# Patient Record
Sex: MALE | Race: WHITE | NOT HISPANIC OR LATINO | Employment: FULL TIME | URBAN - METROPOLITAN AREA
[De-identification: names, ages, dates, MRNs, and addresses within clinical notes are randomized per-mention and may not be internally consistent; named-entity substitution may affect disease eponyms.]

---

## 2017-02-17 ENCOUNTER — TELEPHONE (OUTPATIENT)
Dept: INTERNAL MEDICINE | Facility: CLINIC | Age: 57
End: 2017-02-17

## 2017-02-17 NOTE — TELEPHONE ENCOUNTER
"Pt called stating that insurance will not cover Spiriva and wants you to call in a generic RX. He said they suggested something that started with a \"T\". Please call him at [phone].  =====================================  Pt. Call back and said that the name that his insurance gave him was Tetrabenzine. I tried to look that up but it is not in the systems.  "

## 2017-02-21 ENCOUNTER — TELEPHONE (OUTPATIENT)
Dept: INTERNAL MEDICINE | Facility: CLINIC | Age: 57
End: 2017-02-21

## 2017-10-10 DIAGNOSIS — J44.9 CHRONIC OBSTRUCTIVE PULMONARY DISEASE, UNSPECIFIED COPD TYPE (HCC): Primary | ICD-10-CM

## 2017-10-10 NOTE — TELEPHONE ENCOUNTER
Pt called in and said he needed his Incruse inhaler medication rf.  That medicine is not on his list.   He has not been seen since July 2016.  He said he wont be back in town until dec.     He would like for you to send it to his [facility] and they will forward it down to [place].    [phone]

## 2017-10-10 NOTE — TELEPHONE ENCOUNTER
I called and left ms for pt that I would fax the rx and he could have it sent to where ever. I also let him know that he needed to be seen when he gets back and that  did not write that script and he said he would not rf it again until he is seen.

## 2017-10-10 NOTE — TELEPHONE ENCOUNTER
I printed off the prescription and someone will need to call it in whatever pharmacy once it sent to with 2 refills only

## 2017-12-18 ENCOUNTER — OFFICE VISIT (OUTPATIENT)
Dept: INTERNAL MEDICINE | Facility: CLINIC | Age: 57
End: 2017-12-18

## 2017-12-18 VITALS
SYSTOLIC BLOOD PRESSURE: 118 MMHG | TEMPERATURE: 98.3 F | BODY MASS INDEX: 23 KG/M2 | WEIGHT: 169.8 LBS | HEART RATE: 83 BPM | OXYGEN SATURATION: 96 % | DIASTOLIC BLOOD PRESSURE: 82 MMHG | HEIGHT: 72 IN

## 2017-12-18 DIAGNOSIS — J32.0 MAXILLARY SINUSITIS, UNSPECIFIED CHRONICITY: Primary | ICD-10-CM

## 2017-12-18 DIAGNOSIS — F17.219 CIGARETTE NICOTINE DEPENDENCE WITH NICOTINE-INDUCED DISORDER: ICD-10-CM

## 2017-12-18 DIAGNOSIS — Z11.59 NEED FOR HEPATITIS C SCREENING TEST: Primary | ICD-10-CM

## 2017-12-18 PROCEDURE — 99407 BEHAV CHNG SMOKING > 10 MIN: CPT | Performed by: FAMILY MEDICINE

## 2017-12-18 PROCEDURE — 99214 OFFICE O/P EST MOD 30 MIN: CPT | Performed by: FAMILY MEDICINE

## 2017-12-18 RX ORDER — CEFIXIME 400 MG/1
400 CAPSULE ORAL DAILY
Qty: 10 CAPSULE | Refills: 0 | Status: SHIPPED | OUTPATIENT
Start: 2017-12-18 | End: 2017-12-28

## 2017-12-18 NOTE — PROGRESS NOTES
Subjective   Hammad Justice is a 56 y.o. male.     Chief Complaint   Patient presents with   • Cough     productive/greenish started Friday         Cough   This is a new problem. The current episode started yesterday. The problem occurs constantly. The cough is non-productive. Pertinent negatives include no chest pain, chills, ear congestion, fever, rash, rhinorrhea, sore throat or shortness of breath. Nothing aggravates the symptoms. He has tried nothing for the symptoms.   Sinusitis   This is a new problem. The current episode started in the past 7 days. The problem has been gradually worsening since onset. There has been no fever. The pain is moderate. Associated symptoms include congestion, coughing and sinus pressure. Pertinent negatives include no chills, shortness of breath, sneezing or sore throat. Past treatments include nothing. The treatment provided no relief.        Patient does desire to quit smoking.  Patient states that he's been smoking for many many years.  He has tried multiple things in the past and has been unsuccessful.    The following portions of the patient's history were reviewed and updated as appropriate: allergies, current medications, past family history, past medical history, past social history, past surgical history and problem list.    Review of Systems   Constitutional: Negative for chills and fever.   HENT: Positive for congestion, sinus pain and sinus pressure. Negative for rhinorrhea, sneezing and sore throat.    Eyes: Negative for photophobia and visual disturbance.   Respiratory: Positive for cough. Negative for chest tightness and shortness of breath.    Cardiovascular: Negative for chest pain and palpitations.   Gastrointestinal: Negative for diarrhea, nausea and vomiting.   Genitourinary: Negative for dysuria, frequency and urgency.   Skin: Negative for rash and wound.   Neurological: Negative for dizziness and syncope.   Psychiatric/Behavioral: Negative for behavioral  "problems and confusion.       Objective   Physical Exam   Constitutional: He is oriented to person, place, and time. He appears well-developed and well-nourished.   HENT:   Head: Normocephalic and atraumatic.   Right Ear: External ear normal.   Left Ear: External ear normal.   Nose: Right sinus exhibits maxillary sinus tenderness. Left sinus exhibits maxillary sinus tenderness.   Mouth/Throat: Oropharynx is clear and moist.   Eyes: EOM are normal.   Neck: Normal range of motion. Neck supple.   Cardiovascular: Normal rate, regular rhythm and normal heart sounds.    Pulmonary/Chest: Effort normal and breath sounds normal. No respiratory distress.   Musculoskeletal: Normal range of motion.   Lymphadenopathy:     He has no cervical adenopathy.   Neurological: He is alert and oriented to person, place, and time.   Skin: Skin is warm.   Psychiatric: He has a normal mood and affect. His behavior is normal.   Nursing note and vitals reviewed.      Assessment/Plan   Hammad was seen today for cough.    Diagnoses and all orders for this visit:    Nicotine dependence        -     \"I advised the patient of the risks in continuing to use tobacco,\" and \"I provided this patient with smoking cessation educational materials and discussed how to quit smoking,\" and \"patient has expressed the willingness to quit\".        -     Patient has declined taking Chantix, Zyban, nicotine gum and patches.  He states that if he quit smoking he will be cold turkey.  He is not set aside a date for this.        -     I spent greater than 10 minutes counseling patient about smoking cessation.  I discussed with him the risk associated with smoking.  Patient has expressed the desire to quit smoking with me.    Maxillary sinusitis, unspecified chronicity  -     Cefixime (SUPRAX) 400 MG tablet; Take 1 tablet by mouth Daily for 10 days.  -     Signs of symptoms are most likely due to sinus infection. Recommend OTC medications. Recommend the use of netti " pot. Patient to start suprax.           Return for Next scheduled follow up.    Much of this encounter note is an electronic transcription/translation of spoken language to printed text. The electronic translation of spoken language may permit erroneous, or at times, nonsensical words or phrases to be inadvertently transcribed; although I have reviewed the note for such errors, some may still exist.

## 2017-12-20 LAB
25(OH)D3+25(OH)D2 SERPL-MCNC: 33.5 NG/ML (ref 30–100)
ALBUMIN SERPL-MCNC: 4.6 G/DL (ref 3.5–5.2)
ALBUMIN/GLOB SERPL: 1.7 G/DL
ALP SERPL-CCNC: 108 U/L (ref 39–117)
ALT SERPL-CCNC: 28 U/L (ref 1–41)
APPEARANCE UR: CLEAR
AST SERPL-CCNC: 26 U/L (ref 1–40)
BASOPHILS # BLD AUTO: 0.03 10*3/MM3 (ref 0–0.2)
BASOPHILS NFR BLD AUTO: 0.4 % (ref 0–1.5)
BILIRUB SERPL-MCNC: 0.3 MG/DL (ref 0.1–1.2)
BILIRUB UR QL STRIP: NEGATIVE
BUN SERPL-MCNC: 13 MG/DL (ref 6–20)
BUN/CREAT SERPL: 15.5 (ref 7–25)
CALCIUM SERPL-MCNC: 9.4 MG/DL (ref 8.6–10.5)
CHLORIDE SERPL-SCNC: 99 MMOL/L (ref 98–107)
CHOLEST SERPL-MCNC: 211 MG/DL (ref 100–199)
CO2 SERPL-SCNC: 27.1 MMOL/L (ref 22–29)
COLOR UR: YELLOW
CREAT SERPL-MCNC: 0.84 MG/DL (ref 0.76–1.27)
EOSINOPHIL # BLD AUTO: 0.23 10*3/MM3 (ref 0–0.7)
EOSINOPHIL NFR BLD AUTO: 2.8 % (ref 0.3–6.2)
ERYTHROCYTE [DISTWIDTH] IN BLOOD BY AUTOMATED COUNT: 12.6 % (ref 11.5–14.5)
GLOBULIN SER CALC-MCNC: 2.7 GM/DL
GLUCOSE SERPL-MCNC: 84 MG/DL (ref 65–99)
GLUCOSE UR QL: NEGATIVE
HCT VFR BLD AUTO: 56.1 % (ref 40.4–52.2)
HCV AB S/CO SERPL IA: <0.1 S/CO RATIO (ref 0–0.9)
HDL SERPL-SCNC: 48.7 UMOL/L
HDLC SERPL-MCNC: 71 MG/DL
HGB BLD-MCNC: 18.3 G/DL (ref 13.7–17.6)
HGB UR QL STRIP: NEGATIVE
IMM GRANULOCYTES # BLD: 0.03 10*3/MM3 (ref 0–0.03)
IMM GRANULOCYTES NFR BLD: 0.4 % (ref 0–0.5)
KETONES UR QL STRIP: NEGATIVE
LDL SERPL QN: 21.1 NM
LDL SERPL-SCNC: 1531 NMOL/L
LDL SMALL SERPL-SCNC: 772 NMOL/L
LDLC SERPL CALC-MCNC: 107 MG/DL (ref 0–99)
LEUKOCYTE ESTERASE UR QL STRIP: NEGATIVE
LYMPHOCYTES # BLD AUTO: 1.29 10*3/MM3 (ref 0.9–4.8)
LYMPHOCYTES NFR BLD AUTO: 15.7 % (ref 19.6–45.3)
MCH RBC QN AUTO: 33.8 PG (ref 27–32.7)
MCHC RBC AUTO-ENTMCNC: 32.6 G/DL (ref 32.6–36.4)
MCV RBC AUTO: 103.5 FL (ref 79.8–96.2)
MONOCYTES # BLD AUTO: 0.86 10*3/MM3 (ref 0.2–1.2)
MONOCYTES NFR BLD AUTO: 10.5 % (ref 5–12)
NEUTROPHILS # BLD AUTO: 5.77 10*3/MM3 (ref 1.9–8.1)
NEUTROPHILS NFR BLD AUTO: 70.2 % (ref 42.7–76)
NITRITE UR QL STRIP: NEGATIVE
PH UR STRIP: 5.5 [PH] (ref 5–8)
PLATELET # BLD AUTO: 197 10*3/MM3 (ref 140–500)
POTASSIUM SERPL-SCNC: 5 MMOL/L (ref 3.5–5.2)
PROT SERPL-MCNC: 7.3 G/DL (ref 6–8.5)
PROT UR QL STRIP: NEGATIVE
PSA SERPL-MCNC: 1.14 NG/ML (ref 0–4)
RBC # BLD AUTO: 5.42 10*6/MM3 (ref 4.6–6)
SODIUM SERPL-SCNC: 140 MMOL/L (ref 136–145)
SP GR UR: 1.02 (ref 1–1.03)
T3FREE SERPL-MCNC: 3.5 PG/ML (ref 2–4.4)
T4 FREE SERPL-MCNC: 1.13 NG/DL (ref 0.93–1.7)
TRIGL SERPL-MCNC: 164 MG/DL (ref 0–149)
TSH SERPL DL<=0.005 MIU/L-ACNC: 0.75 MIU/ML (ref 0.27–4.2)
UROBILINOGEN UR STRIP-MCNC: NORMAL MG/DL
WBC # BLD AUTO: 8.21 10*3/MM3 (ref 4.5–10.7)

## 2018-01-10 ENCOUNTER — OFFICE VISIT (OUTPATIENT)
Dept: INTERNAL MEDICINE | Facility: CLINIC | Age: 58
End: 2018-01-10

## 2018-01-10 VITALS
DIASTOLIC BLOOD PRESSURE: 72 MMHG | HEIGHT: 71 IN | SYSTOLIC BLOOD PRESSURE: 124 MMHG | WEIGHT: 169 LBS | HEART RATE: 84 BPM | BODY MASS INDEX: 23.66 KG/M2 | OXYGEN SATURATION: 98 %

## 2018-01-10 DIAGNOSIS — J44.9 CHRONIC OBSTRUCTIVE PULMONARY DISEASE, UNSPECIFIED COPD TYPE (HCC): Chronic | ICD-10-CM

## 2018-01-10 DIAGNOSIS — F17.219 CIGARETTE NICOTINE DEPENDENCE WITH NICOTINE-INDUCED DISORDER: Chronic | ICD-10-CM

## 2018-01-10 DIAGNOSIS — E78.5 HYPERLIPIDEMIA, UNSPECIFIED HYPERLIPIDEMIA TYPE: Chronic | ICD-10-CM

## 2018-01-10 DIAGNOSIS — D75.1 POLYCYTHEMIA SECONDARY TO SMOKING: Chronic | ICD-10-CM

## 2018-01-10 DIAGNOSIS — Z51.81 THERAPEUTIC DRUG MONITORING: ICD-10-CM

## 2018-01-10 DIAGNOSIS — Z23 NEED FOR PNEUMOCOCCAL VACCINATION: ICD-10-CM

## 2018-01-10 DIAGNOSIS — Z86.010 HISTORY OF COLON POLYPS: Chronic | ICD-10-CM

## 2018-01-10 DIAGNOSIS — E55.9 VITAMIN D DEFICIENCY: Chronic | ICD-10-CM

## 2018-01-10 DIAGNOSIS — Z00.00 ROUTINE PHYSICAL EXAMINATION: Primary | ICD-10-CM

## 2018-01-10 DIAGNOSIS — Z71.6 ENCOUNTER FOR SMOKING CESSATION COUNSELING: ICD-10-CM

## 2018-01-10 DIAGNOSIS — Z23 NEED FOR TDAP VACCINATION: ICD-10-CM

## 2018-01-10 PROCEDURE — 99396 PREV VISIT EST AGE 40-64: CPT | Performed by: INTERNAL MEDICINE

## 2018-01-10 PROCEDURE — 99406 BEHAV CHNG SMOKING 3-10 MIN: CPT | Performed by: INTERNAL MEDICINE

## 2018-01-10 PROCEDURE — 90472 IMMUNIZATION ADMIN EACH ADD: CPT | Performed by: INTERNAL MEDICINE

## 2018-01-10 PROCEDURE — 90732 PPSV23 VACC 2 YRS+ SUBQ/IM: CPT | Performed by: INTERNAL MEDICINE

## 2018-01-10 PROCEDURE — 90715 TDAP VACCINE 7 YRS/> IM: CPT | Performed by: INTERNAL MEDICINE

## 2018-01-10 PROCEDURE — 90471 IMMUNIZATION ADMIN: CPT | Performed by: INTERNAL MEDICINE

## 2018-01-10 NOTE — PROGRESS NOTES
01/10/2018    Patient Information  Hammad Justice                                                                                          [address]  [facility] [address]      1960  [phone]      Chief Complaint:     Routine annual physical examination and follow-up lab work.  No new acute complaints.    History of Present Illness:    Patient with a history of hyperlipidemia, COPD, cigarette smoking, secondary polycythemia, history of hyperplastic colon polyps, vitamin D deficiency.  He presents today for his routine annual exam and follow-up lab work.  He currently indicates that he is feeling well and has no new acute complaints.  Past medical history reviewed and updated where necessary including health maintenance parameters.  This reveals he needs a TDaP in PPSV 23 which we can give today.  He prefers not to get the influenza vaccination.    Review of Systems   Constitution: Negative.   HENT: Negative.    Eyes: Negative.    Cardiovascular: Negative.    Respiratory: Positive for cough, snoring and wheezing.    Endocrine: Negative.    Hematologic/Lymphatic: Negative.    Skin: Negative.    Musculoskeletal: Negative.    Gastrointestinal: Negative.    Genitourinary: Negative.    Neurological: Negative.    Psychiatric/Behavioral: Negative.    Allergic/Immunologic: Negative.        Active Problems:    Patient Active Problem List   Diagnosis   • COPD (chronic obstructive pulmonary disease)   • History of colon polyps, 09/12/2013--hyperplastic ×1.   • Generalized osteoarthritis of multiple sites   • Hyperlipidemia   • Nicotine dependence   • Vitamin D deficiency   • Therapeutic drug monitoring   • Routine physical examination   • Encounter for smoking cessation counseling   • Polycythemia secondary to smoking         Past Medical History:   Diagnosis Date   • COPD (chronic obstructive pulmonary disease) 8/12/2013 08/12/2013--pulmonary function tests revealed FVC 3.35, 66% of predicted. Post  bronchodilator FVC 3.31. FEV1 1.77, 45% of predicted. 1.8 to post bronchodilator. FEV1/FVC 53, Total lung capacity is 105% of predicted. Normal diffusion. Moderately severe obstructive pattern with no reversibility.   08/12/2013--pulmonary consult. Pulmonary function tests revealed moderate to severe obstructive pattern. Diagnosis COPD. Chronic bronchitis type. Patient was started on Ventolin when necessary and Spiriva HandiHaler use once daily.   • Generalized osteoarthritis of multiple sites 6/14/2016   • History of acute bronchitis with bronchospasm 09/26/2012 09/26/2012--exacerbation asthmatic bronchitis treated with prednisone and Levaquin.   • History of Benign essential hypertension 04/04/2012 04/04/2012--blood pressure remains low and patient had to discontinue lisinopril because of dizziness.   03/14/2012--blood pressure too low at 102/64. Lisinopril HCT decreased to 10/12.5.   02/22/2012--blood pressure elevated at 140/90. Lisinopril HCT 20/12.5 daily initiated.   • History of chest x-ray 05/03/2012 05/03/2012--chest x-ray negative.   • History of colon polyps, 09/12/2013--hyperplastic ×1. 9/12/2013 09/12/2013--colonoscopy revealed a 3-4 mm polyp in the sigmoid colon which was biopsied and removed. Otherwise, normal colonoscopy to cecum with an excellent prep. Pathology returned hyperplastic.   • History of pulmonary function tests 08/12/2013 08/12/2013--pulmonary function tests revealed FVC 3.35, 66% of predicted. Post bronchodilator FVC 3.31. FEV1 1.77, 45% of predicted. 1.8 to post bronchodilator. FEV1/FVC 53, Total lung capacity is 105% of predicted. Normal diffusion. Moderately severe obstructive pattern with no reversibility.   • Hyperlipidemia 6/14/2016   • Nicotine dependence 6/14/2016   • Polycythemia secondary to smoking 7/7/2016 07/07/2016--routine physical exam.  CBC reveals hemoglobin elevated at 17.9 and hematocrit elevated at 53.5.  Patient has a somewhat heavy smoker and I  "feel this is the etiology of this and will not pursue hematologic workup, at least at the present time.  Patient is in the process of trying to discontinue cigarettes.  Aspirin 81 mg per day initiated.   • Vitamin D deficiency 2016         Past Surgical History:   Procedure Laterality Date   • COLONOSCOPY  2013--colonoscopy revealed a 3-4 mm polyp in the sigmoid colon which was biopsied and removed. Otherwise, normal colonoscopy to cecum with an excellent prep. Pathology returned hyperplastic.   • KNEE ARTHROSCOPY Right 33 years of age    Age 33--right meniscus surgery.         No Known Allergies        Current Outpatient Prescriptions:   •  aspirin 81 MG tablet, Take 1 tablet by mouth daily., Disp: 90 tablet, Rfl: 3  •  INCRUSE ELLIPTA 62.5 MCG/INH aerosol powder , INHALE 1 PUFF DAILY., Disp: 30 each, Rfl: 2    Current Facility-Administered Medications:   •  pneumococcal polysaccharide 23-valent (PNEUMOVAX-23) vaccine 0.5 mL, 0.5 mL, Intramuscular, During Hospitalization, Niko Villasenor MD      Family History   Problem Relation Age of Onset   • Anuerysm Brother      Cerebral Artery Aneurysm. Brother  of a brain aneurysm at age 52.   • Hypertension Other      Benign Essential   • Heart disease Other          Social History     Social History   • Marital status: Single     Spouse name: N/A   • Number of children: N/A   • Years of education: N/A     Occupational History   •  Consultant      Social History Main Topics   • Smoking status: Current Every Day Smoker     Packs/day: 2.00   • Smokeless tobacco: Never Used   • Alcohol use Yes      Comment: Moderately   • Drug use: No   • Sexual activity: Yes     Partners: Female     Other Topics Concern   • Not on file     Social History Narrative         Vitals:    01/10/18 1229   BP: 124/72   Pulse: 84   SpO2: 98%   Weight: 76.7 kg (169 lb)   Height: 181.6 cm (71.5\")          Physical Exam:      General: Alert and oriented x " 3, with appropriate affect; no acute distress.  HEENT: pupils equal, round, and reactive to light; extraocular movements intact; sclera nonicteric; nasal mucosa normal; pharynx normal; tympanic membranes and ear canals normal.  Neck: without JVD, thyromegaly, bruit, or adenopathy.  Lungs: Mild bilateral scattered rhonchi and occasional wheeze.  Heart: auscultation reveals regular rate and rhythm without murmur, rub, gallop, or click.  Abdomen: is soft and nontender, without hepato-splenomegaly, mass or hernia. Normal bowel sounds; .  Urologic exam: reveals normal male genitalia without testicular mass or penile/scrotal lesion.  Digital rectal exam and Prostate: deferred.  Extremities: are without clubbing, cyanosis, or edema.  Vascular: no signs of peripheral arterial disease or venous insufficiency/varicosities.  Neurological: intact without focal deficit, including cranial and peripheral nerves.  Station and gait observed to be normal during ingress and egress from the examination area.  Sensation and deep tendon reflexes tested if clinically indicated and are normal.  Musculoskeletal: exam is normal, without signs of synovitis, significant degeneration or deformity. Skin examination: without rash or significant lesions.      Lab/other results:    NMR reveals total cholesterol of 211.  Triglycerides elevated at 164.  LDL particle number borderline elevated 1531.  Small LDL particle number elevated at 772.  HDL particle number is very good at 48.7.  Urinalysis normal.  Thyroid function tests normal.  PSA normal at 1.14.  Vitamin D normal.  Hepatitis C antibody screening is negative.    Assessment/Plan:     Diagnosis Plan   1. Routine physical examination     2. Hyperlipidemia, unspecified hyperlipidemia type     3. Chronic obstructive pulmonary disease, unspecified COPD type     4. Cigarette nicotine dependence with nicotine-induced disorder     5. Encounter for smoking cessation counseling     6. Polycythemia  secondary to smoking     7. History of colon polyps, 09/12/2013--hyperplastic ×1.     8. Vitamin D deficiency     9. Therapeutic drug monitoring     10. Need for pneumococcal vaccination  pneumococcal polysaccharide 23-valent (PNEUMOVAX-23) vaccine 0.5 mL   11. Need for Tdap vaccination  Tdap Vaccine Greater Than or Equal To 8yo IM     Patient presents with essentially normal annual physical except for the following issues: He has mild hyperlipidemia that has much improved with diet over the years.  COPD seems to be stable on the current inhaler.  Patient is well aware regarding the need to stop smoking.  See below.  Secondary polycythemia is related to the cigarette smoking and COPD and this would certainly benefit from discontinuation of cigarettes.  I do not think the polycythemia is high enough to cause hyperviscosity at this time but I have recommended that patient take an aspirin 81 mg on a regular basis.  Patient has a history of hyperplastic colon polyps that do not increase his risk for colon cancer.  Vitamin D is therapeutic.    Plan is as follows: PPSV 23 given.  TDaP given.  Encourage smoking cessation.  Patient will follow-up in one year with lab prior for his annual exam or follow-up as needed.    01/10/2018--encounter for smoking cessation.  I advised the patient of the risks and continuing to use tobacco, specifically cigarettes smoking, and I have provided patient with smoking cessation educational materials and discussed how to quit smoking including the possibility of using medication.  Patient is well aware that he needs to quit but he has found it quite difficult to do so.  He is currently not quite ready to make the commitment that is giving it quite a consideration.      Procedures

## 2018-01-27 ENCOUNTER — HOSPITAL ENCOUNTER (OUTPATIENT)
Dept: CARDIOLOGY | Facility: HOSPITAL | Age: 58
Discharge: HOME | End: 2018-01-27
Attending: SPECIALIST | Admitting: SPECIALIST

## 2018-01-27 ENCOUNTER — TRANSCRIBE ORDERS (OUTPATIENT)
Dept: ADMINISTRATIVE | Facility: HOSPITAL | Age: 58
End: 2018-01-27

## 2018-01-27 ENCOUNTER — LAB (OUTPATIENT)
Dept: LAB | Facility: HOSPITAL | Age: 58
End: 2018-01-27
Attending: SPECIALIST

## 2018-01-27 DIAGNOSIS — I10 ESSENTIAL HYPERTENSION, MALIGNANT: ICD-10-CM

## 2018-01-27 DIAGNOSIS — I10 ESSENTIAL HYPERTENSION, MALIGNANT: Primary | ICD-10-CM

## 2018-01-27 LAB
ANION GAP SERPL CALCULATED.3IONS-SCNC: 10.1 MMOL/L
BASOPHILS # BLD AUTO: 0.04 10*3/MM3 (ref 0–0.2)
BASOPHILS NFR BLD AUTO: 0.7 % (ref 0–2)
BUN BLD-MCNC: 13 MG/DL (ref 6–20)
BUN/CREAT SERPL: 11.8 (ref 7–25)
CALCIUM SPEC-SCNC: 8.8 MG/DL (ref 8.6–10.5)
CHLORIDE SERPL-SCNC: 99 MMOL/L (ref 98–107)
CO2 SERPL-SCNC: 28.9 MMOL/L (ref 22–29)
CREAT BLD-MCNC: 1.1 MG/DL (ref 0.76–1.27)
DEPRECATED RDW RBC AUTO: 44.3 FL (ref 37–54)
EOSINOPHIL # BLD AUTO: 0.24 10*3/MM3 (ref 0.1–0.3)
EOSINOPHIL NFR BLD AUTO: 4.1 % (ref 0–4)
ERYTHROCYTE [DISTWIDTH] IN BLOOD BY AUTOMATED COUNT: 12.2 % (ref 11.5–14.5)
GFR SERPL CREATININE-BSD FRML MDRD: 69 ML/MIN/1.73
GLUCOSE BLD-MCNC: 89 MG/DL (ref 65–99)
HCT VFR BLD AUTO: 51.6 % (ref 42–52)
HGB BLD-MCNC: 17.5 G/DL (ref 14–18)
IMM GRANULOCYTES # BLD: 0.02 10*3/MM3 (ref 0–0.03)
IMM GRANULOCYTES NFR BLD: 0.3 % (ref 0–0.5)
LYMPHOCYTES # BLD AUTO: 1.43 10*3/MM3 (ref 0.6–4.8)
LYMPHOCYTES NFR BLD AUTO: 24.5 % (ref 20–45)
MCH RBC QN AUTO: 33.1 PG (ref 27–31)
MCHC RBC AUTO-ENTMCNC: 33.9 G/DL (ref 31–37)
MCV RBC AUTO: 97.7 FL (ref 80–94)
MONOCYTES # BLD AUTO: 0.7 10*3/MM3 (ref 0–1)
MONOCYTES NFR BLD AUTO: 12 % (ref 3–8)
NEUTROPHILS # BLD AUTO: 3.41 10*3/MM3 (ref 1.5–8.3)
NEUTROPHILS NFR BLD AUTO: 58.4 % (ref 45–70)
NRBC BLD MANUAL-RTO: 0 /100 WBC (ref 0–0)
PLATELET # BLD AUTO: 236 10*3/MM3 (ref 140–500)
PMV BLD AUTO: 10.7 FL (ref 7.4–10.4)
POTASSIUM BLD-SCNC: 3.7 MMOL/L (ref 3.5–5.2)
RBC # BLD AUTO: 5.28 10*6/MM3 (ref 4.7–6.1)
SODIUM BLD-SCNC: 138 MMOL/L (ref 136–145)
WBC NRBC COR # BLD: 5.84 10*3/MM3 (ref 4.8–10.8)

## 2018-01-27 PROCEDURE — 93005 ELECTROCARDIOGRAM TRACING: CPT | Performed by: SPECIALIST

## 2018-01-27 PROCEDURE — 80048 BASIC METABOLIC PNL TOTAL CA: CPT

## 2018-01-27 PROCEDURE — 36415 COLL VENOUS BLD VENIPUNCTURE: CPT

## 2018-01-27 PROCEDURE — 85025 COMPLETE CBC W/AUTO DIFF WBC: CPT

## 2018-01-27 PROCEDURE — 93010 ELECTROCARDIOGRAM REPORT: CPT | Performed by: INTERNAL MEDICINE

## 2019-03-13 DIAGNOSIS — E55.9 VITAMIN D DEFICIENCY: Chronic | ICD-10-CM

## 2019-03-13 DIAGNOSIS — E78.5 HYPERLIPIDEMIA, UNSPECIFIED HYPERLIPIDEMIA TYPE: Chronic | ICD-10-CM

## 2019-03-13 DIAGNOSIS — Z00.00 ROUTINE PHYSICAL EXAMINATION: Primary | ICD-10-CM

## 2019-03-14 LAB
25(OH)D3+25(OH)D2 SERPL-MCNC: 25.5 NG/ML (ref 30–100)
ALBUMIN SERPL-MCNC: 4.5 G/DL (ref 3.5–5.2)
ALBUMIN/GLOB SERPL: 2 G/DL
ALP SERPL-CCNC: 82 U/L (ref 39–117)
ALT SERPL-CCNC: 25 U/L (ref 1–41)
APPEARANCE UR: CLEAR
AST SERPL-CCNC: 20 U/L (ref 1–40)
BACTERIA #/AREA URNS HPF: ABNORMAL /HPF
BASOPHILS # BLD AUTO: 0.03 10*3/MM3 (ref 0–0.2)
BASOPHILS NFR BLD AUTO: 0.6 % (ref 0–1.5)
BILIRUB SERPL-MCNC: 0.4 MG/DL (ref 0.1–1.2)
BILIRUB UR QL STRIP: NEGATIVE
BUN SERPL-MCNC: 17 MG/DL (ref 6–20)
BUN/CREAT SERPL: 17.7 (ref 7–25)
CALCIUM SERPL-MCNC: 9.8 MG/DL (ref 8.6–10.5)
CASTS URNS MICRO: ABNORMAL
CHLORIDE SERPL-SCNC: 103 MMOL/L (ref 98–107)
CHOLEST SERPL-MCNC: 197 MG/DL (ref 100–199)
CK SERPL-CCNC: 117 U/L (ref 20–200)
CO2 SERPL-SCNC: 28.8 MMOL/L (ref 22–29)
COLOR UR: YELLOW
CREAT SERPL-MCNC: 0.96 MG/DL (ref 0.76–1.27)
CRYSTALS URNS MICRO: ABNORMAL
EOSINOPHIL # BLD AUTO: 0.16 10*3/MM3 (ref 0–0.4)
EOSINOPHIL NFR BLD AUTO: 3.3 % (ref 0.3–6.2)
EPI CELLS #/AREA URNS HPF: ABNORMAL /HPF
ERYTHROCYTE [DISTWIDTH] IN BLOOD BY AUTOMATED COUNT: 13.2 % (ref 12.3–15.4)
GLOBULIN SER CALC-MCNC: 2.3 GM/DL
GLUCOSE SERPL-MCNC: 106 MG/DL (ref 65–99)
GLUCOSE UR QL: NEGATIVE
HCT VFR BLD AUTO: 53.4 % (ref 37.5–51)
HDL SERPL-SCNC: 40.9 UMOL/L
HDLC SERPL-MCNC: 54 MG/DL
HGB BLD-MCNC: 17 G/DL (ref 13–17.7)
HGB UR QL STRIP: NEGATIVE
IMM GRANULOCYTES # BLD AUTO: 0.02 10*3/MM3 (ref 0–0.05)
IMM GRANULOCYTES NFR BLD AUTO: 0.4 % (ref 0–0.5)
KETONES UR QL STRIP: NEGATIVE
LDL SERPL QN: 21.7 NM
LDL SERPL-SCNC: 1230 NMOL/L
LDL SMALL SERPL-SCNC: 435 NMOL/L
LDLC SERPL CALC-MCNC: 108 MG/DL (ref 0–99)
LEUKOCYTE ESTERASE UR QL STRIP: ABNORMAL
LYMPHOCYTES # BLD AUTO: 1.01 10*3/MM3 (ref 0.7–3.1)
LYMPHOCYTES NFR BLD AUTO: 20.7 % (ref 19.6–45.3)
MCH RBC QN AUTO: 32.2 PG (ref 26.6–33)
MCHC RBC AUTO-ENTMCNC: 31.8 G/DL (ref 31.5–35.7)
MCV RBC AUTO: 101.1 FL (ref 79–97)
MONOCYTES # BLD AUTO: 0.6 10*3/MM3 (ref 0.1–0.9)
MONOCYTES NFR BLD AUTO: 12.3 % (ref 5–12)
NEUTROPHILS # BLD AUTO: 3.06 10*3/MM3 (ref 1.4–7)
NEUTROPHILS NFR BLD AUTO: 62.7 % (ref 42.7–76)
NITRITE UR QL STRIP: NEGATIVE
NRBC BLD AUTO-RTO: 0 /100 WBC (ref 0–0)
PH UR STRIP: 5.5 [PH] (ref 5–8)
PLATELET # BLD AUTO: 213 10*3/MM3 (ref 140–450)
POTASSIUM SERPL-SCNC: 4.7 MMOL/L (ref 3.5–5.2)
PROT SERPL-MCNC: 6.8 G/DL (ref 6–8.5)
PROT UR QL STRIP: NEGATIVE
PSA SERPL-MCNC: 1.02 NG/ML (ref 0–4)
RBC # BLD AUTO: 5.28 10*6/MM3 (ref 4.14–5.8)
RBC #/AREA URNS HPF: ABNORMAL /HPF
SODIUM SERPL-SCNC: 142 MMOL/L (ref 136–145)
SP GR UR: 1.03 (ref 1–1.03)
T3FREE SERPL-MCNC: 3.6 PG/ML (ref 2–4.4)
T4 FREE SERPL-MCNC: 1.08 NG/DL (ref 0.93–1.7)
TRIGL SERPL-MCNC: 173 MG/DL (ref 0–149)
TSH SERPL DL<=0.005 MIU/L-ACNC: 0.87 MIU/ML (ref 0.27–4.2)
UROBILINOGEN UR STRIP-MCNC: ABNORMAL MG/DL
WBC # BLD AUTO: 4.88 10*3/MM3 (ref 3.4–10.8)
WBC #/AREA URNS HPF: ABNORMAL /HPF

## 2019-03-20 ENCOUNTER — OFFICE VISIT (OUTPATIENT)
Dept: INTERNAL MEDICINE | Facility: CLINIC | Age: 59
End: 2019-03-20

## 2019-03-20 VITALS
SYSTOLIC BLOOD PRESSURE: 132 MMHG | WEIGHT: 175 LBS | HEIGHT: 71 IN | DIASTOLIC BLOOD PRESSURE: 80 MMHG | OXYGEN SATURATION: 98 % | HEART RATE: 84 BPM | BODY MASS INDEX: 24.5 KG/M2

## 2019-03-20 DIAGNOSIS — F17.219 CIGARETTE NICOTINE DEPENDENCE WITH NICOTINE-INDUCED DISORDER: Chronic | ICD-10-CM

## 2019-03-20 DIAGNOSIS — J44.9 CHRONIC OBSTRUCTIVE PULMONARY DISEASE, UNSPECIFIED COPD TYPE (HCC): Chronic | ICD-10-CM

## 2019-03-20 DIAGNOSIS — M15.9 GENERALIZED OSTEOARTHRITIS OF MULTIPLE SITES: Chronic | ICD-10-CM

## 2019-03-20 DIAGNOSIS — Z86.010 HISTORY OF COLON POLYPS: Chronic | ICD-10-CM

## 2019-03-20 DIAGNOSIS — Z00.00 ROUTINE PHYSICAL EXAMINATION: Primary | ICD-10-CM

## 2019-03-20 DIAGNOSIS — R82.998 CALCIUM OXALATE CRYSTALS PRESENT IN URINE: ICD-10-CM

## 2019-03-20 DIAGNOSIS — R39.12 BENIGN PROSTATIC HYPERPLASIA WITH WEAK URINARY STREAM: ICD-10-CM

## 2019-03-20 DIAGNOSIS — Z71.6 ENCOUNTER FOR SMOKING CESSATION COUNSELING: ICD-10-CM

## 2019-03-20 DIAGNOSIS — N40.1 BENIGN PROSTATIC HYPERPLASIA WITH WEAK URINARY STREAM: ICD-10-CM

## 2019-03-20 DIAGNOSIS — E78.5 HYPERLIPIDEMIA, UNSPECIFIED HYPERLIPIDEMIA TYPE: Chronic | ICD-10-CM

## 2019-03-20 DIAGNOSIS — R82.81 PYURIA: ICD-10-CM

## 2019-03-20 DIAGNOSIS — E55.9 VITAMIN D DEFICIENCY: Chronic | ICD-10-CM

## 2019-03-20 DIAGNOSIS — D75.1 POLYCYTHEMIA SECONDARY TO SMOKING: Chronic | ICD-10-CM

## 2019-03-20 PROBLEM — R73.01 IMPAIRED FASTING GLUCOSE: Status: ACTIVE | Noted: 2019-03-20

## 2019-03-20 PROBLEM — R73.01 IMPAIRED FASTING GLUCOSE: Chronic | Status: ACTIVE | Noted: 2019-03-20

## 2019-03-20 PROCEDURE — 99396 PREV VISIT EST AGE 40-64: CPT | Performed by: INTERNAL MEDICINE

## 2019-03-20 NOTE — PROGRESS NOTES
03/20/2019    Patient Information  Hammad Justice                                                                                          [address] [facility] [address]      1960  [unfilled]  There is no work phone number on file.    Chief Complaint:     Routine annual physical examination and follow-up lab work.  Complaining of decreased urine flow.    History of Present Illness:    Patient with a history of COPD, colon polyps, osteoarthritis, hyperlipidemia, polycythemia secondary to smoking.  He presents today for his routine annual physical exam and follow-up lab work.  Patient does have a new complaint consistent with BPH as described below.  His past medical history reviewed and updated were necessary including health maintenance parameters.  This reveals he is up-to-date or else accounted for.  Patient does not get influenza vaccine.  We did discuss the new shingles vaccine and patient will check with his insurance company regarding coverage here in the office.    The history regarding decreased urine flow:    March 20, 2019--patient presents with a progressively worsening decreased urine flow over the past year or so.  He reports he just takes a long time for him to initiate and complete urination.  He has no pain or dysuria.  No blood noted in his urine.  Urinalysis today reveals 31-50 WBCs but there are 3-6 epithelial cells present as well as 1+ calcium oxalate crystals.  No bacteria seen.  No red blood cells.  PSA is normal at 1.02.  Given the pyuria as well as calcium oxalate crystals, patient referred to the urologist.    Review of Systems   Constitution: Negative.   HENT: Negative.    Eyes: Negative.    Cardiovascular: Negative.    Respiratory: Negative.    Endocrine: Negative.    Hematologic/Lymphatic: Negative.    Skin: Negative.    Musculoskeletal: Negative.    Gastrointestinal: Negative.    Genitourinary: Positive for hesitancy, incomplete emptying and nocturia. Negative for  hematuria, pelvic pain and urgency.   Neurological: Negative.    Psychiatric/Behavioral: Negative.    Allergic/Immunologic: Negative.        Active Problems:    Patient Active Problem List   Diagnosis   • COPD (chronic obstructive pulmonary disease) (CMS/[facility])   • History of colon polyps, 09/12/2013--hyperplastic ×1.   • Generalized osteoarthritis of multiple sites   • Hyperlipidemia   • Nicotine dependence   • Vitamin D deficiency   • Therapeutic drug monitoring   • Routine physical examination   • Encounter for smoking cessation counseling   • Polycythemia secondary to smoking   • Benign prostatic hyperplasia with weak urinary stream   • Pyuria   • Calcium oxalate crystals present in urine         Past Medical History:   Diagnosis Date   • COPD (chronic obstructive pulmonary disease) (CMS/[facility]) 8/12/2013 08/12/2013--pulmonary function tests revealed FVC 3.35, 66% of predicted. Post bronchodilator FVC 3.31. FEV1 1.77, 45% of predicted. 1.8 to post bronchodilator. FEV1/FVC 53, Total lung capacity is 105% of predicted. Normal diffusion. Moderately severe obstructive pattern with no reversibility.   08/12/2013--pulmonary consult. Pulmonary function tests revealed moderate to severe obstructive pattern. Diagnosis COPD. Chronic bronchitis type. Patient was started on Ventolin when necessary and Spiriva HandiHaler use once daily.   • Generalized osteoarthritis of multiple sites 6/14/2016   • History of acute bronchitis with bronchospasm 09/26/2012 09/26/2012--exacerbation asthmatic bronchitis treated with prednisone and Levaquin.   • History of Benign essential hypertension 04/04/2012 04/04/2012--blood pressure remains low and patient had to discontinue lisinopril because of dizziness.   03/14/2012--blood pressure too low at 102/64. Lisinopril HCT decreased to 10/12.5.   02/22/2012--blood pressure elevated at 140/90. Lisinopril HCT 20/12.5 daily initiated.   • History of chest x-ray 05/03/2012 05/03/2012--chest  x-ray negative.   • History of colon polyps, 09/12/2013--hyperplastic ×1. 9/12/2013 09/12/2013--colonoscopy revealed a 3-4 mm polyp in the sigmoid colon which was biopsied and removed. Otherwise, normal colonoscopy to cecum with an excellent prep. Pathology returned hyperplastic.   • History of pulmonary function tests 08/12/2013 08/12/2013--pulmonary function tests revealed FVC 3.35, 66% of predicted. Post bronchodilator FVC 3.31. FEV1 1.77, 45% of predicted. 1.8 to post bronchodilator. FEV1/FVC 53, Total lung capacity is 105% of predicted. Normal diffusion. Moderately severe obstructive pattern with no reversibility.   • Hyperlipidemia 6/14/2016   • Nicotine dependence 6/14/2016   • Polycythemia secondary to smoking 7/7/2016 07/07/2016--routine physical exam.  CBC reveals hemoglobin elevated at 17.9 and hematocrit elevated at 53.5.  Patient has a somewhat heavy smoker and I feel this is the etiology of this and will not pursue hematologic workup, at least at the present time.  Patient is in the process of trying to discontinue cigarettes.  Aspirin 81 mg per day initiated.   • Vitamin D deficiency 6/14/2016         Past Surgical History:   Procedure Laterality Date   • COLONOSCOPY  09/12/2013 09/12/2013--colonoscopy revealed a 3-4 mm polyp in the sigmoid colon which was biopsied and removed. Otherwise, normal colonoscopy to cecum with an excellent prep. Pathology returned hyperplastic.   • KNEE ARTHROSCOPY Right 33 years of age    Age 33--right meniscus surgery.         No Known Allergies        Current Outpatient Medications:   •  aspirin 81 MG tablet, Take 1 tablet by mouth daily., Disp: 90 tablet, Rfl: 3  •  Fluticasone Furoate-Vilanterol (BREO ELLIPTA IN), Inhale 1 puff Daily., Disp: , Rfl:   •  INCRUSE ELLIPTA 62.5 MCG/INH aerosol powder , INHALE 1 PUFF DAILY., Disp: 30 each, Rfl: 9      Family History   Problem Relation Age of Onset   • Anuerysm Brother         Cerebral Artery Aneurysm. Brother  " of a brain aneurysm at age 52.   • Hypertension Other         Benign Essential   • Heart disease Other                Vitals:    19 1131   BP: 132/80   Pulse: 84   SpO2: 98%   Weight: 79.4 kg (175 lb)   Height: 180 cm (70.87\")          Physical Exam:    General: Alert and oriented x 3, with appropriate affect; no acute distress.  HEENT: pupils equal, round, and reactive to light; extraocular movements intact; sclera nonicteric; nasal mucosa normal; pharynx normal; tympanic membranes and ear canals normal.  Neck: without JVD, thyromegaly, bruit, or adenopathy.  Lungs: clear to auscultation in all fields.  Heart: auscultation reveals regular rate and rhythm without murmur, rub, gallop, or click.  Abdomen: is soft and nontender, without hepato-splenomegaly, mass or hernia. Normal bowel sounds; .  Urologic exam: reveals normal male genitalia without testicular mass or penile/scrotal lesion.  Digital rectal exam and Prostate: deferred.  Extremities: are without clubbing, cyanosis, or edema.  Vascular: no signs of peripheral arterial disease or venous insufficiency/varicosities.  Neurological: intact without focal deficit, including cranial and peripheral nerves.  Station and gait observed to be normal during ingress and egress from the examination area.  Sensation and deep tendon reflexes tested if clinically indicated and are normal.  Musculoskeletal: exam is normal, without signs of synovitis, significant degeneration or deformity. Skin examination: without rash or significant lesions.    Lab/other results:    NMR reveals a total cholesterol of 197.  Triglycerides elevated 173.  LDL particle number slightly elevated at 1230.  Small LDL particle number excellent 435.  HDL particle number fairly good at 40.9.  CBC normal except MCV elevated 101.1, hematocrit elevated at 53.4, hemoglobin normal at 17.  CMP normal except blood sugar slightly elevated at 106.  Urinalysis reveals 31-50 WBCs, 0-2 RBCs, 0-2 " epithelial cells, 1+ calcium oxalate crystals, 0 bacteria.  Thyroid function tests are normal.  Vitamin D is a little low at 25.5.  PSA normal at 1.02.  CPK normal.    Assessment/Plan:     Diagnosis Plan   1. Routine physical examination     2. Chronic obstructive pulmonary disease, unspecified COPD type (CMS/HCC)     3. History of colon polyps, 09/12/2013--hyperplastic ×1.     4. Generalized osteoarthritis of multiple sites     5. Hyperlipidemia, unspecified hyperlipidemia type     6. Vitamin D deficiency     7. Polycythemia secondary to smoking     8. Cigarette nicotine dependence with nicotine-induced disorder     9. Encounter for smoking cessation counseling     10. Benign prostatic hyperplasia with weak urinary stream     11. Pyuria     12. Calcium oxalate crystals present in urine       Patient presents with essentially normal annual except for the following issues: He has COPD which seems fairly stable but unfortunately continues to smoke.  See below.  Patient has a history of hyperplastic colon polyps that do not increase his risk for colon cancer.  He is up-to-date on his colonoscopy.  He has hyperlipidemia but this is technically dyslipidemia and it does not require medication.  His vitamin D is a little low but I am not going to start vitamin D supplementation due to the crystals in his urine and concern over kidney stones.  Patient does have a family history of kidney stones.  Polycythemia is related to smoking.  Smoking cessation discussed below.  Patient has new complaints consistent with BPH and given that plus the crystal urea and pyuria, I will refer him to urology for further evaluation.    Several preventative health issues discussed including review of vaccinations and recommendations, including dietary issues, exercise and weight loss.  Safe sex practices discussed.  Patient advised to wear seatbelt whenever driving and avoid texting and driving.  Also advised to look both ways before crossing  the street.  Colon cancer prevention discussed and is up-to-date with colonoscopy.  Advised to avoid tobacco products and minimize alcohol consumption.    Plan is as follows: Urology referral given.  No change in current medical regimen.  Patient will follow-up in 1 year with lab prior for his annual exam or follow-up as needed.    March 20, 2019--encounter for smoking cessation.  I advised the patient of the risk of continuing to use tobacco, specifically cigarettes.  I provided patient with smoking cessation educational materials and we discussed how to quit smoking including the possible use of medication such as Wellbutrin or Chantix.  We also discussed the use of nicotine patches/gum.  I think that would be the best course of action initially.  Patient has expressed a desire to quit but admits that it will be very difficult.  If he decides he would like to try medication for smoking cessation of asked him to give me a call and we will initiate it.  A total of 5-1/2 minutes spent.    Procedures

## 2019-07-31 DIAGNOSIS — J44.9 CHRONIC OBSTRUCTIVE PULMONARY DISEASE, UNSPECIFIED COPD TYPE (HCC): Chronic | ICD-10-CM

## 2019-11-16 DIAGNOSIS — J44.9 CHRONIC OBSTRUCTIVE PULMONARY DISEASE, UNSPECIFIED COPD TYPE (HCC): Chronic | ICD-10-CM

## 2020-02-17 DIAGNOSIS — J44.9 CHRONIC OBSTRUCTIVE PULMONARY DISEASE, UNSPECIFIED COPD TYPE (HCC): Chronic | ICD-10-CM

## 2020-03-13 DIAGNOSIS — R82.81 PYURIA: ICD-10-CM

## 2020-03-13 DIAGNOSIS — R82.998 CALCIUM OXALATE CRYSTALS PRESENT IN URINE: ICD-10-CM

## 2020-03-13 DIAGNOSIS — N40.1 BENIGN PROSTATIC HYPERPLASIA WITH WEAK URINARY STREAM: Chronic | ICD-10-CM

## 2020-03-13 DIAGNOSIS — E55.9 VITAMIN D DEFICIENCY: Chronic | ICD-10-CM

## 2020-03-13 DIAGNOSIS — R73.01 IMPAIRED FASTING GLUCOSE: Chronic | ICD-10-CM

## 2020-03-13 DIAGNOSIS — R39.12 BENIGN PROSTATIC HYPERPLASIA WITH WEAK URINARY STREAM: Chronic | ICD-10-CM

## 2020-03-13 DIAGNOSIS — Z51.81 THERAPEUTIC DRUG MONITORING: ICD-10-CM

## 2020-03-13 DIAGNOSIS — Z00.00 ROUTINE PHYSICAL EXAMINATION: ICD-10-CM

## 2020-03-13 DIAGNOSIS — E78.5 HYPERLIPIDEMIA, UNSPECIFIED HYPERLIPIDEMIA TYPE: Chronic | ICD-10-CM

## 2020-03-13 DIAGNOSIS — E78.5 HYPERLIPIDEMIA, UNSPECIFIED HYPERLIPIDEMIA TYPE: Primary | Chronic | ICD-10-CM

## 2020-03-17 LAB
25(OH)D3+25(OH)D2 SERPL-MCNC: 44.6 NG/ML (ref 30–100)
ALBUMIN SERPL-MCNC: 4.5 G/DL (ref 3.5–5.2)
ALBUMIN/GLOB SERPL: 2 G/DL
ALP SERPL-CCNC: 85 U/L (ref 39–117)
ALT SERPL-CCNC: 25 U/L (ref 1–41)
AST SERPL-CCNC: 21 U/L (ref 1–40)
BILIRUB SERPL-MCNC: 0.4 MG/DL (ref 0.2–1.2)
BUN SERPL-MCNC: 14 MG/DL (ref 6–20)
BUN/CREAT SERPL: 15.4 (ref 7–25)
CALCIUM SERPL-MCNC: 9.2 MG/DL (ref 8.6–10.5)
CHLORIDE SERPL-SCNC: 98 MMOL/L (ref 98–107)
CHOLEST SERPL-MCNC: 193 MG/DL (ref 100–199)
CK SERPL-CCNC: 121 U/L (ref 20–200)
CO2 SERPL-SCNC: 28.8 MMOL/L (ref 22–29)
CREAT SERPL-MCNC: 0.91 MG/DL (ref 0.76–1.27)
ERYTHROCYTE [DISTWIDTH] IN BLOOD BY AUTOMATED COUNT: 12.1 % (ref 12.3–15.4)
GLOBULIN SER CALC-MCNC: 2.3 GM/DL
GLUCOSE SERPL-MCNC: 102 MG/DL (ref 65–99)
HBA1C MFR BLD: 5.6 % (ref 4.8–5.6)
HCT VFR BLD AUTO: 49.1 % (ref 37.5–51)
HDL SERPL-SCNC: 44.5 UMOL/L
HDLC SERPL-MCNC: 67 MG/DL
HGB BLD-MCNC: 17 G/DL (ref 13–17.7)
LDL SERPL QN: 21.8 NM
LDL SERPL-SCNC: 1174 NMOL/L
LDL SMALL SERPL-SCNC: 316 NMOL/L
LDLC SERPL CALC-MCNC: 104 MG/DL (ref 0–99)
MCH RBC QN AUTO: 32.9 PG (ref 26.6–33)
MCHC RBC AUTO-ENTMCNC: 34.6 G/DL (ref 31.5–35.7)
MCV RBC AUTO: 95 FL (ref 79–97)
PLATELET # BLD AUTO: 241 10*3/MM3 (ref 140–450)
POTASSIUM SERPL-SCNC: 4.5 MMOL/L (ref 3.5–5.2)
PROT SERPL-MCNC: 6.8 G/DL (ref 6–8.5)
PSA SERPL-MCNC: 1.72 NG/ML (ref 0–4)
RBC # BLD AUTO: 5.17 10*6/MM3 (ref 4.14–5.8)
SODIUM SERPL-SCNC: 138 MMOL/L (ref 136–145)
T3FREE SERPL-MCNC: 3.2 PG/ML (ref 2–4.4)
T4 FREE SERPL-MCNC: 1.11 NG/DL (ref 0.93–1.7)
TRIGL SERPL-MCNC: 111 MG/DL (ref 0–149)
TSH SERPL DL<=0.005 MIU/L-ACNC: 1.37 UIU/ML (ref 0.27–4.2)
WBC # BLD AUTO: 8.28 10*3/MM3 (ref 3.4–10.8)

## 2020-03-23 ENCOUNTER — RESULTS ENCOUNTER (OUTPATIENT)
Dept: INTERNAL MEDICINE | Facility: CLINIC | Age: 60
End: 2020-03-23

## 2020-03-23 ENCOUNTER — OFFICE VISIT (OUTPATIENT)
Dept: INTERNAL MEDICINE | Facility: CLINIC | Age: 60
End: 2020-03-23

## 2020-03-23 VITALS
DIASTOLIC BLOOD PRESSURE: 80 MMHG | HEIGHT: 70 IN | WEIGHT: 166.8 LBS | BODY MASS INDEX: 23.88 KG/M2 | OXYGEN SATURATION: 98 % | HEART RATE: 84 BPM | SYSTOLIC BLOOD PRESSURE: 142 MMHG

## 2020-03-23 DIAGNOSIS — E78.2 MIXED HYPERLIPIDEMIA: Chronic | ICD-10-CM

## 2020-03-23 DIAGNOSIS — M15.9 GENERALIZED OSTEOARTHRITIS OF MULTIPLE SITES: Chronic | ICD-10-CM

## 2020-03-23 DIAGNOSIS — R39.12 BENIGN PROSTATIC HYPERPLASIA WITH WEAK URINARY STREAM: Chronic | ICD-10-CM

## 2020-03-23 DIAGNOSIS — F17.210 CIGARETTE NICOTINE DEPENDENCE WITHOUT COMPLICATION: Chronic | ICD-10-CM

## 2020-03-23 DIAGNOSIS — R82.998 CALCIUM OXALATE CRYSTALS PRESENT IN URINE: ICD-10-CM

## 2020-03-23 DIAGNOSIS — J41.1 MUCOPURULENT CHRONIC BRONCHITIS (HCC): Chronic | ICD-10-CM

## 2020-03-23 DIAGNOSIS — D75.1 POLYCYTHEMIA SECONDARY TO SMOKING: Chronic | ICD-10-CM

## 2020-03-23 DIAGNOSIS — N40.1 BENIGN PROSTATIC HYPERPLASIA WITH WEAK URINARY STREAM: Chronic | ICD-10-CM

## 2020-03-23 DIAGNOSIS — Z12.11 COLON CANCER SCREENING: ICD-10-CM

## 2020-03-23 DIAGNOSIS — Z86.010 HISTORY OF COLON POLYPS: Chronic | ICD-10-CM

## 2020-03-23 DIAGNOSIS — Z00.00 ROUTINE PHYSICAL EXAMINATION: Primary | ICD-10-CM

## 2020-03-23 DIAGNOSIS — R73.01 IMPAIRED FASTING GLUCOSE: Chronic | ICD-10-CM

## 2020-03-23 DIAGNOSIS — E55.9 VITAMIN D DEFICIENCY: Chronic | ICD-10-CM

## 2020-03-23 PROBLEM — R82.81 PYURIA: Status: RESOLVED | Noted: 2019-03-20 | Resolved: 2020-03-23

## 2020-03-23 PROCEDURE — 99396 PREV VISIT EST AGE 40-64: CPT | Performed by: INTERNAL MEDICINE

## 2020-03-23 RX ORDER — SODIUM PHOSPHATE,MONO-DIBASIC 19G-7G/118
ENEMA (ML) RECTAL
Start: 2020-03-23 | End: 2020-12-07

## 2020-03-23 NOTE — PROGRESS NOTES
03/23/2020    Patient Information  Hammad Justice                                                                                          [address] [facility] [facility] [address]      1960  [unfilled]  There is no work phone number on file.    Chief Complaint:     Routine annual physical examination and follow-up blood work in order to monitor chronic medical issues listed in history of present illness.  No new acute complaints.    History of Present Illness:    Patient with a history of impaired fasting glucose, COPD, colon polyps, hyperlipidemia, nicotine dependence from cigarettes, secondary polycythemia, vitamin D deficiency, BPH, calcium oxalate crystals in urine.  He presents today for his routine annual physical exam and follow-up lab work.  His past medical history reviewed and updated were necessary including health maintenance parameters.  This reveals he is up-to-date or else accounted for after today's visit.  He needs his last shingles vaccine.  It appears that he has not had influenza vaccine.    Review of Systems   Constitution: Negative.   HENT: Negative.    Eyes: Negative.    Cardiovascular: Positive for dyspnea on exertion.   Respiratory: Positive for cough and sputum production.    Endocrine: Negative.    Hematologic/Lymphatic: Negative.    Skin: Negative.    Musculoskeletal: Negative.    Gastrointestinal: Negative.    Genitourinary: Negative.    Neurological: Negative.    Psychiatric/Behavioral: Negative.    Allergic/Immunologic: Negative.        Active Problems:    Patient Active Problem List   Diagnosis   • Chronic obstructive pulmonary disease (COPD)   • History of colon polyps, 09/12/2013--hyperplastic ×1.   • Generalized osteoarthritis of multiple sites   • Hyperlipidemia   • Cigarette nicotine dependence without complication   • Vitamin D deficiency   • Therapeutic drug monitoring   • Routine physical examination   • Encounter for smoking cessation counseling   • Polycythemia  secondary to smoking   • Benign prostatic hyperplasia with weak urinary stream   • Calcium oxalate crystals present in urine   • Impaired fasting glucose         Past Medical History:   Diagnosis Date   • Calcium oxalate crystals present in urine 3/20/2019    March 20, 2019--patient presents with a progressively worsening decreased urine flow over the past year or so.  He reports he just takes a long time for him to initiate and complete urination.  He has no pain or dysuria.  No blood noted in his urine.  Urinalysis today reveals 31-50 WBCs but there are 3-6 epithelial cells present as well as 1+ calcium oxalate crystals.  No bacteria seen.  No red bl   • Chronic obstructive pulmonary disease (COPD) 8/12/2013 08/12/2013--pulmonary function tests revealed FVC 3.35, 66% of predicted. Post bronchodilator FVC 3.31. FEV1 1.77, 45% of predicted. 1.8 to post bronchodilator. FEV1/FVC 53, Total lung capacity is 105% of predicted. Normal diffusion. Moderately severe obstructive pattern with no reversibility.   08/12/2013--pulmonary consult. Pulmonary function tests revealed moderate to severe obstructive pattern   • Cigarette nicotine dependence without complication 6/14/2016   • COPD (chronic obstructive pulmonary disease) (CMS/[facility]) 8/12/2013 08/12/2013--pulmonary function tests revealed FVC 3.35, 66% of predicted. Post bronchodilator FVC 3.31. FEV1 1.77, 45% of predicted. 1.8 to post bronchodilator. FEV1/FVC 53, Total lung capacity is 105% of predicted. Normal diffusion. Moderately severe obstructive pattern with no reversibility.   08/12/2013--pulmonary consult. Pulmonary function tests revealed moderate to severe obstructive pattern. Diagnosis COPD. Chronic bronchitis type. Patient was started on Ventolin when necessary and Spiriva HandiHaler use once daily.   • Generalized osteoarthritis of multiple sites 6/14/2016   • History of acute bronchitis with bronchospasm 09/26/2012 09/26/2012--exacerbation asthmatic  bronchitis treated with prednisone and Levaquin.   • History of Benign essential hypertension 04/04/2012 04/04/2012--blood pressure remains low and patient had to discontinue lisinopril because of dizziness.   03/14/2012--blood pressure too low at 102/64. Lisinopril HCT decreased to 10/12.5.   02/22/2012--blood pressure elevated at 140/90. Lisinopril HCT 20/12.5 daily initiated.   • History of colon polyps, 09/12/2013--hyperplastic ×1. 9/12/2013 09/12/2013--colonoscopy revealed a 3-4 mm polyp in the sigmoid colon which was biopsied and removed. Otherwise, normal colonoscopy to cecum with an excellent prep. Pathology returned hyperplastic.   • History of pulmonary function tests 08/12/2013 08/12/2013--pulmonary function tests revealed FVC 3.35, 66% of predicted. Post bronchodilator FVC 3.31. FEV1 1.77, 45% of predicted. 1.8 to post bronchodilator. FEV1/FVC 53, Total lung capacity is 105% of predicted. Normal diffusion. Moderately severe obstructive pattern with no reversibility.   • Hyperlipidemia 6/14/2016   • Impaired fasting glucose 3/20/2019   • Nicotine dependence 6/14/2016   • Polycythemia secondary to smoking 7/7/2016 07/07/2016--routine physical exam.  CBC reveals hemoglobin elevated at 17.9 and hematocrit elevated at 53.5.  Patient has a somewhat heavy smoker and I feel this is the etiology of this and will not pursue hematologic workup, at least at the present time.  Patient is in the process of trying to discontinue cigarettes.  Aspirin 81 mg per day initiated.   • Vitamin D deficiency 6/14/2016         Past Surgical History:   Procedure Laterality Date   • COLONOSCOPY  09/12/2013 09/12/2013--colonoscopy revealed a 3-4 mm polyp in the sigmoid colon which was biopsied and removed. Otherwise, normal colonoscopy to cecum with an excellent prep. Pathology returned hyperplastic.   • KNEE ARTHROSCOPY Right 33 years of age    Age 33--right meniscus surgery.         No Known Allergies        Current  "Outpatient Medications:   •  aspirin 81 MG tablet, Take 1 tablet by mouth daily., Disp: 90 tablet, Rfl: 3  •  glucosamine-chondroitin (Cosamin DS) 500-400 MG capsule capsule, Take 3 p.o. daily for arthritis, Disp: , Rfl:   •  umeclidinium-vilanterol (Anoro Ellipta) 62.5-25 MCG/INH aerosol powder  inhaler, Inhale once daily as directed for COPD, Disp: 3 each, Rfl: 11      Family History   Problem Relation Age of Onset   • Anuerysm Brother         Cerebral Artery Aneurysm. Brother  of a brain aneurysm at age 52.   • Hypertension Other         Benign Essential   • Heart disease Other          Social History     Socioeconomic History   • Marital status:      Spouse name: Not on file   • Number of children: 2   • Years of education: Not on file   • Highest education level: 8th grade   Occupational History   • Occupation:  Consultant   Social Needs   • Financial resource strain: Not hard at all   • Food insecurity:     Worry: Never true     Inability: Never true   • Transportation needs:     Medical: No     Non-medical: No   Tobacco Use   • Smoking status: Current Every Day Smoker     Packs/day: 2.00   • Smokeless tobacco: Never Used   Substance and Sexual Activity   • Alcohol use: Yes     Frequency: 4 or more times a week     Drinks per session: 5 or 6     Comment: Moderately   • Drug use: No   • Sexual activity: Yes     Partners: Female   Lifestyle   • Physical activity:     Days per week: 0 days     Minutes per session: 0 min   • Stress: Not at all   Relationships   • Social connections:     Talks on phone: More than three times a week     Gets together: Once a week     Attends Yazidism service: Never     Active member of club or organization: No     Attends meetings of clubs or organizations: Never     Relationship status:          Vitals:    20 0703   BP: 142/80   BP Location: Left arm   Pulse: 84   SpO2: 98%   Weight: 75.7 kg (166 lb 12.8 oz)   Height: 177.8 cm (70\")    "     Body mass index is 23.93 kg/m².      Physical Exam:    General: Alert and oriented x 3.  No acute distress.  Normal affect.  HEENT: Pupils equal, round, reactive to light; extraocular movements intact; sclerae nonicteric; pharynx, ear canals and TMs normal.  Neck: Without JVD, thyromegaly, bruit, or adenopathy.  Lungs: Clear to auscultation in all fields.  Heart: Regular rate and rhythm without murmur, rub, gallop, or click.  Abdomen: Soft, nontender, without hepatosplenomegaly or hernia.  Bowel sounds normal.  : Deferred.  Rectal: Deferred.  Extremities: Without clubbing, cyanosis, edema, or pulse deficit.  Neurologic: Intact without focal deficit.  Normal station and gait observed during ingress and egress from the examination room.  Skin: Without significant lesion.  Musculoskeletal: Unremarkable.    Lab/other results:    NMR reveals a total cholesterol 193.  Triglycerides 111.  LDL particle number slightly elevated 1074.  Small LDL particle number excellent 316.  HDL particle number is normal at 44.5.  CMP normal except glucose slightly elevated at 102.  CBC is normal.  Hemoglobin A1c 5.6.  Thyroid function test normal.  PSA normal at 1.72.  Vitamin D normal.  CPK normal.    Assessment/Plan:     Diagnosis Plan   1. Routine physical examination  CBC (No Diff)    Comprehensive Metabolic Panel    Hemoglobin A1c    NMR LipoProfile    Vitamin D 25 Hydroxy    Urinalysis With Microscopic If Indicated (No Culture) - Urine, Clean Catch    TSH    T4, Free    T3, Free    PSA DIAGNOSTIC   2. Impaired fasting glucose  Comprehensive Metabolic Panel    Hemoglobin A1c   3. Chronic obstructive pulmonary disease (COPD)  umeclidinium-vilanterol (Anoro Ellipta) 62.5-25 MCG/INH aerosol powder  inhaler   4. History of colon polyps, 09/12/2013--hyperplastic ×1.     5. Hyperlipidemia  NMR LipoProfile    TSH    T4, Free    T3, Free   6. Cigarette nicotine dependence without complication     7. Vitamin D deficiency  Vitamin D 25  Hydroxy   8. Polycythemia secondary to smoking  CBC (No Diff)   9. Benign prostatic hyperplasia with weak urinary stream  PSA DIAGNOSTIC   10. Calcium oxalate crystals present in urine  Urinalysis With Microscopic If Indicated (No Culture) - Urine, Clean Catch   11. Generalized osteoarthritis of multiple sites  glucosamine-chondroitin (Cosamin DS) 500-400 MG capsule capsule     Patient presents with essentially normal annual physical except for the following issues: He has very mild impaired fasting glucose that does not require medication.  Patient has COPD and has occasional exacerbations.  I think he may do well on Anoro Ellipta inhaler and I would like for him to try it.  He continues to smoke cigarettes but I have not done formal smoking cessation today because patient is not committed to quit.  He has a history of colon polyps which were hyperplastic and do not necessarily increase his risk for colon cancer.  However, I think he is a reasonable risk to have Cologuard testing.  He technically has dyslipidemia not hyperlipidemia but this does not require medication.  Vitamin D is in the normal range.  He had calcium oxalate crystals in his urine as well as symptomatic BPH and I referred him to the urologist.  Patient deferred cystoscopy.  Currently his symptoms are tolerable.  He is at risk for kidney stones.    Several preventative health issues discussed including review of vaccinations and recommendations, including dietary issues, exercise and weight loss.  Safe sex practices discussed.  Patient advised to wear seatbelt whenever driving and avoid texting and driving.  Also advised to look both ways before crossing the street.  Colon cancer prevention/screening discussed and patient has a history of hyperplastic colon polyps that do not increase his risk for colon cancer but he may be at risk for other polyps.  We have elected to proceed with Cologuard and patient is aware that if that test comes back  negative we will repeated every 3 years and possibly avoid colonoscopy.  Advised to avoid tobacco products and minimize alcohol consumption, but I did not do formal smoking cessation encounter today because patient is not committed to quit.    Plan is as follows: Discontinue Breo and start Anoro Ellipta inhaler 1 inhalation daily.  Try Cosamin ASU 3 p.o. daily for the arthritis.  Patient is aware he needs to take it for at least 4 months before he makes a decision whether or not it is working.  Cologuard ordered.  No other changes.  Patient will follow-up in 1 year for his annual exam or follow-up as needed.    Procedures

## 2020-04-13 ENCOUNTER — TELEPHONE (OUTPATIENT)
Dept: INTERNAL MEDICINE | Facility: CLINIC | Age: 60
End: 2020-04-13

## 2020-04-13 NOTE — TELEPHONE ENCOUNTER
Called patient to notify him of results, and that we would get him set up for a colonoscopy once these are being scheduled again. Patient expressed understanding.

## 2020-04-13 NOTE — TELEPHONE ENCOUNTER
----- Message from Niko Villasenor MD sent at 4/3/2020  7:45 AM EDT -----  Tell patient his Cologuard was positive.  He will need a colonoscopy but this will have to be postponed due to the Covid-19 pandemic.

## 2020-06-12 ENCOUNTER — TELEPHONE (OUTPATIENT)
Dept: INTERNAL MEDICINE | Facility: CLINIC | Age: 60
End: 2020-06-12

## 2020-06-12 NOTE — TELEPHONE ENCOUNTER
Patient is calling to see if he can get his colonoscopy scheduled.  He would like to get done with same  as last colonoscopy.  Please schedule as soon as possible since the cologuard test was positive.      Patient call back [phone]

## 2020-06-15 DIAGNOSIS — Z86.010 HISTORY OF COLON POLYPS: Primary | Chronic | ICD-10-CM

## 2020-06-30 ENCOUNTER — PREP FOR SURGERY (OUTPATIENT)
Dept: OTHER | Facility: HOSPITAL | Age: 60
End: 2020-06-30

## 2020-06-30 DIAGNOSIS — R19.5 POSITIVE COLORECTAL CANCER SCREENING USING DNA-BASED STOOL TEST: ICD-10-CM

## 2020-06-30 DIAGNOSIS — Z86.010 HISTORY OF COLON POLYPS: Primary | ICD-10-CM

## 2020-07-08 PROBLEM — R19.5 POSITIVE COLORECTAL CANCER SCREENING USING DNA-BASED STOOL TEST: Status: ACTIVE | Noted: 2020-07-08

## 2020-07-21 ENCOUNTER — TRANSCRIBE ORDERS (OUTPATIENT)
Dept: SLEEP MEDICINE | Facility: HOSPITAL | Age: 60
End: 2020-07-21

## 2020-07-21 DIAGNOSIS — Z01.818 OTHER SPECIFIED PRE-OPERATIVE EXAMINATION: Primary | ICD-10-CM

## 2020-07-25 ENCOUNTER — LAB (OUTPATIENT)
Dept: LAB | Facility: HOSPITAL | Age: 60
End: 2020-07-25

## 2020-07-25 DIAGNOSIS — Z01.818 OTHER SPECIFIED PRE-OPERATIVE EXAMINATION: ICD-10-CM

## 2020-07-25 PROCEDURE — C9803 HOPD COVID-19 SPEC COLLECT: HCPCS

## 2020-07-25 PROCEDURE — U0004 COV-19 TEST NON-CDC HGH THRU: HCPCS

## 2020-07-27 LAB
REF LAB TEST METHOD: NORMAL
SARS-COV-2 RNA RESP QL NAA+PROBE: NOT DETECTED

## 2020-07-28 ENCOUNTER — HOSPITAL ENCOUNTER (OUTPATIENT)
Facility: HOSPITAL | Age: 60
Setting detail: HOSPITAL OUTPATIENT SURGERY
Discharge: HOME | End: 2020-07-28
Attending: SURGERY | Admitting: SURGERY

## 2020-07-28 ENCOUNTER — ANESTHESIA (OUTPATIENT)
Dept: GASTROENTEROLOGY | Facility: HOSPITAL | Age: 60
End: 2020-07-28

## 2020-07-28 ENCOUNTER — ANESTHESIA EVENT (OUTPATIENT)
Dept: GASTROENTEROLOGY | Facility: HOSPITAL | Age: 60
End: 2020-07-28

## 2020-07-28 VITALS
WEIGHT: 160.5 LBS | TEMPERATURE: 98.1 F | SYSTOLIC BLOOD PRESSURE: 109 MMHG | HEIGHT: 71 IN | RESPIRATION RATE: 16 BRPM | BODY MASS INDEX: 22.47 KG/M2 | OXYGEN SATURATION: 96 % | HEART RATE: 61 BPM | DIASTOLIC BLOOD PRESSURE: 75 MMHG

## 2020-07-28 DIAGNOSIS — R19.5 POSITIVE COLORECTAL CANCER SCREENING USING DNA-BASED STOOL TEST: ICD-10-CM

## 2020-07-28 DIAGNOSIS — Z86.010 HISTORY OF COLON POLYPS: ICD-10-CM

## 2020-07-28 PROCEDURE — 45380 COLONOSCOPY AND BIOPSY: CPT | Performed by: SURGERY

## 2020-07-28 PROCEDURE — 25010000002 PROPOFOL 10 MG/ML EMULSION: Performed by: NURSE ANESTHETIST, CERTIFIED REGISTERED

## 2020-07-28 PROCEDURE — 88305 TISSUE EXAM BY PATHOLOGIST: CPT | Performed by: SURGERY

## 2020-07-28 PROCEDURE — 45385 COLONOSCOPY W/LESION REMOVAL: CPT | Performed by: SURGERY

## 2020-07-28 PROCEDURE — S0260 H&P FOR SURGERY: HCPCS | Performed by: SURGERY

## 2020-07-28 RX ORDER — PROPOFOL 10 MG/ML
VIAL (ML) INTRAVENOUS AS NEEDED
Status: DISCONTINUED | OUTPATIENT
Start: 2020-07-28 | End: 2020-07-28 | Stop reason: SURG

## 2020-07-28 RX ORDER — PROPOFOL 10 MG/ML
VIAL (ML) INTRAVENOUS CONTINUOUS PRN
Status: DISCONTINUED | OUTPATIENT
Start: 2020-07-28 | End: 2020-07-28 | Stop reason: SURG

## 2020-07-28 RX ORDER — LIDOCAINE HYDROCHLORIDE 20 MG/ML
INJECTION, SOLUTION INFILTRATION; PERINEURAL AS NEEDED
Status: DISCONTINUED | OUTPATIENT
Start: 2020-07-28 | End: 2020-07-28 | Stop reason: SURG

## 2020-07-28 RX ORDER — ONDANSETRON 2 MG/ML
4 INJECTION INTRAMUSCULAR; INTRAVENOUS ONCE AS NEEDED
Status: DISCONTINUED | OUTPATIENT
Start: 2020-07-28 | End: 2020-07-28 | Stop reason: HOSPADM

## 2020-07-28 RX ORDER — SODIUM CHLORIDE, SODIUM LACTATE, POTASSIUM CHLORIDE, CALCIUM CHLORIDE 600; 310; 30; 20 MG/100ML; MG/100ML; MG/100ML; MG/100ML
1000 INJECTION, SOLUTION INTRAVENOUS CONTINUOUS
Status: DISCONTINUED | OUTPATIENT
Start: 2020-07-28 | End: 2020-07-28 | Stop reason: HOSPADM

## 2020-07-28 RX ADMIN — PROPOFOL 50 MG: 10 INJECTION, EMULSION INTRAVENOUS at 10:10

## 2020-07-28 RX ADMIN — LIDOCAINE HYDROCHLORIDE 60 MG: 20 INJECTION, SOLUTION INFILTRATION; PERINEURAL at 10:08

## 2020-07-28 RX ADMIN — SODIUM CHLORIDE, POTASSIUM CHLORIDE, SODIUM LACTATE AND CALCIUM CHLORIDE 1000 ML: 600; 310; 30; 20 INJECTION, SOLUTION INTRAVENOUS at 09:26

## 2020-07-28 RX ADMIN — PROPOFOL 50 MG: 10 INJECTION, EMULSION INTRAVENOUS at 10:12

## 2020-07-28 RX ADMIN — PROPOFOL 200 MCG/KG/MIN: 10 INJECTION, EMULSION INTRAVENOUS at 10:10

## 2020-07-28 NOTE — ANESTHESIA POSTPROCEDURE EVALUATION
"Patient: Hammad Justice    Procedure Summary     Date:  07/28/20 Room / Location:   [facility]    Anesthesia Start:  1005 Anesthesia Stop:  1037    Procedure:  COLONOSCOPY to cecum and TI with hot snare polypectomies and cold bxs (N/A ) Diagnosis:       History of colon polyps      Positive colorectal cancer screening using DNA-based stool test      (History of colon polyps [Z86.010])      (Positive colorectal cancer screening using DNA-based stool test [R19.5])    Surgeon:  Steve Dalton MD Provider:  Eben Laguna MD    Anesthesia Type:  MAC ASA Status:  3          Anesthesia Type: MAC    Vitals  Vitals Value Taken Time   BP 93/75 7/28/2020 10:47 AM   Temp     Pulse 75 7/28/2020 10:47 AM   Resp 16 7/28/2020 10:47 AM   SpO2 96 % 7/28/2020 10:47 AM           Post Anesthesia Care and Evaluation    Patient location during evaluation: bedside  Patient participation: complete - patient participated  Level of consciousness: awake  Pain score: 2  Pain management: adequate  Airway patency: patent  Anesthetic complications: No anesthetic complications  PONV Status: none  Cardiovascular status: acceptable  Respiratory status: acceptable  Hydration status: acceptable    Comments: BP 93/75 (BP Location: Left arm, Patient Position: Lying)   Pulse 75   Temp 36.7 °C (98.1 °F) (Oral)   Resp 16   Ht 180.3 cm (71\")   Wt 72.8 kg (160 lb 8 oz)   SpO2 96%   BMI 22.39 kg/m²         "

## 2020-07-28 NOTE — ANESTHESIA PREPROCEDURE EVALUATION
Anesthesia Evaluation     Patient summary reviewed and Nursing notes reviewed   NPO Solid Status: > 8 hours  NPO Liquid Status: > 2 hours           Airway   Mallampati: II  TM distance: >3 FB  Neck ROM: full  Dental - normal exam     Pulmonary - normal exam   (+) a smoker Current Smoked day of surgery, COPD,   Cardiovascular - normal exam    (+) hypertension, hyperlipidemia,       Neuro/Psych- negative ROS  GI/Hepatic/Renal/Endo - negative ROS     Musculoskeletal     Abdominal    Substance History - negative use     OB/GYN negative ob/gyn ROS         Other   arthritis,                      Anesthesia Plan    ASA 3     MAC       Anesthetic plan, all risks, benefits, and alternatives have been provided, discussed and informed consent has been obtained with: patient.

## 2020-07-29 LAB
CYTO UR: NORMAL
LAB AP CASE REPORT: NORMAL
PATH REPORT.FINAL DX SPEC: NORMAL
PATH REPORT.GROSS SPEC: NORMAL

## 2020-08-10 ENCOUNTER — TELEPHONE (OUTPATIENT)
Dept: SURGERY | Facility: CLINIC | Age: 60
End: 2020-08-10

## 2020-08-10 NOTE — TELEPHONE ENCOUNTER
----- Message from Steve Dalton MD sent at 8/7/2020 11:17 AM EDT -----  Please call the patient with c-scope results. PIC c-scope in 5 years

## 2020-12-07 ENCOUNTER — OFFICE VISIT (OUTPATIENT)
Dept: INTERNAL MEDICINE | Facility: CLINIC | Age: 60
End: 2020-12-07

## 2020-12-07 VITALS
DIASTOLIC BLOOD PRESSURE: 78 MMHG | OXYGEN SATURATION: 96 % | WEIGHT: 169 LBS | SYSTOLIC BLOOD PRESSURE: 130 MMHG | HEART RATE: 85 BPM | HEIGHT: 71 IN | BODY MASS INDEX: 23.66 KG/M2

## 2020-12-07 DIAGNOSIS — J41.1 MUCOPURULENT CHRONIC BRONCHITIS (HCC): Chronic | ICD-10-CM

## 2020-12-07 DIAGNOSIS — F17.210 CIGARETTE NICOTINE DEPENDENCE WITHOUT COMPLICATION: Chronic | ICD-10-CM

## 2020-12-07 DIAGNOSIS — J44.1 ACUTE EXACERBATION OF CHRONIC OBSTRUCTIVE PULMONARY DISEASE (COPD) (HCC): Primary | ICD-10-CM

## 2020-12-07 PROBLEM — R19.5 POSITIVE COLORECTAL CANCER SCREENING USING DNA-BASED STOOL TEST: Status: RESOLVED | Noted: 2020-07-08 | Resolved: 2020-12-07

## 2020-12-07 PROCEDURE — 99214 OFFICE O/P EST MOD 30 MIN: CPT | Performed by: INTERNAL MEDICINE

## 2020-12-07 RX ORDER — CEFDINIR 300 MG/1
CAPSULE ORAL
Qty: 20 CAPSULE | Refills: 0 | Status: SHIPPED | OUTPATIENT
Start: 2020-12-07 | End: 2021-04-13

## 2020-12-07 RX ORDER — PREDNISONE 10 MG/1
TABLET ORAL
Qty: 56 TABLET | Refills: 0 | Status: SHIPPED | OUTPATIENT
Start: 2020-12-07 | End: 2021-04-13

## 2020-12-07 NOTE — PROGRESS NOTES
12/07/2020    Patient Information  Hammad Justice                                                                                          [address]  [facility] [address]      1960  [unfilled]  There is no work phone number on file.    Chief Complaint:     Complaining of chest congestion, cough, wheezing.    History of Present Illness:    Patient with a history of COPD presents today with an exacerbation as described below.  Past medical history reviewed and updated were necessary including health maintenance parameters.  This reveals he is currently up-to-date or else accounted for.    The history regarding exacerbation of COPD:    December 7, 2020--patient with history of COPD who is a smoker presents today with complaints of chest congestion, productive cough, wheezing, and shortness of breath.  No fever but he had chills last night.  No other systemic signs or symptoms.  Symptoms started about 4 days ago.  2 days into the illness, his sputum became green.  On exam there is bilateral wheezes and rhonchi.  Only fair airflow.  No definite signs of consolidation.  Plan is as follows: Prednisone 50 mg p.o. daily x 7 days, taper and discontinue.  Cefdinir 300 mg p.o. twice daily x10 days.  Patient should contact me if his symptoms persist and certainly if they worsen.    Review of Systems   Constitution: Positive for fever. Negative for chills.   HENT: Negative.    Eyes: Negative.    Cardiovascular: Negative.    Respiratory: Positive for cough, shortness of breath, sputum production and wheezing.    Endocrine: Negative.    Hematologic/Lymphatic: Negative.    Skin: Negative.    Musculoskeletal: Negative.    Gastrointestinal: Negative.    Genitourinary: Negative.    Neurological: Negative.    Psychiatric/Behavioral: Negative.    Allergic/Immunologic: Negative.        Active Problems:    Patient Active Problem List   Diagnosis   • Chronic obstructive pulmonary disease (COPD)   • History of colon  polyps, 7/28/2020--tubular adenoma x 2.  Hyperplastic x 3.  09/12/2013--hyperplastic ×1.   • Generalized osteoarthritis of multiple sites   • Hyperlipidemia   • Cigarette nicotine dependence without complication   • Vitamin D deficiency   • Therapeutic drug monitoring   • Routine physical examination   • Encounter for smoking cessation counseling   • Polycythemia secondary to smoking   • Benign prostatic hyperplasia with weak urinary stream   • Calcium oxalate crystals present in urine   • Impaired fasting glucose   • Acute exacerbation of chronic obstructive pulmonary disease (COPD) (CMS/[facility])         Past Medical History:   Diagnosis Date   • Calcium oxalate crystals present in urine 3/20/2019    March 20, 2019--patient presents with a progressively worsening decreased urine flow over the past year or so.  He reports he just takes a long time for him to initiate and complete urination.  He has no pain or dysuria.  No blood noted in his urine.  Urinalysis today reveals 31-50 WBCs but there are 3-6 epithelial cells present as well as 1+ calcium oxalate crystals.  No bacteria seen.  No red bl   • Chronic obstructive pulmonary disease (COPD) 8/12/2013 08/12/2013--pulmonary function tests revealed FVC 3.35, 66% of predicted. Post bronchodilator FVC 3.31. FEV1 1.77, 45% of predicted. 1.8 to post bronchodilator. FEV1/FVC 53, Total lung capacity is 105% of predicted. Normal diffusion. Moderately severe obstructive pattern with no reversibility.   08/12/2013--pulmonary consult. Pulmonary function tests revealed moderate to severe obstructive pattern   • Cigarette nicotine dependence without complication 6/14/2016   • COPD (chronic obstructive pulmonary disease) (CMS/[facility]) 8/12/2013 08/12/2013--pulmonary function tests revealed FVC 3.35, 66% of predicted. Post bronchodilator FVC 3.31. FEV1 1.77, 45% of predicted. 1.8 to post bronchodilator. FEV1/FVC 53, Total lung capacity is 105% of predicted. Normal diffusion.  Moderately severe obstructive pattern with no reversibility.   08/12/2013--pulmonary consult. Pulmonary function tests revealed moderate to severe obstructive pattern. Diagnosis COPD. Chronic bronchitis type. Patient was started on Ventolin when necessary and Spiriva HandiHaler use once daily.   • Generalized osteoarthritis of multiple sites 6/14/2016   • History of Benign essential hypertension 04/04/2012 04/04/2012--blood pressure remains low and patient had to discontinue lisinopril because of dizziness.   03/14/2012--blood pressure too low at 102/64. Lisinopril HCT decreased to 10/12.5.   02/22/2012--blood pressure elevated at 140/90. Lisinopril HCT 20/12.5 daily initiated.   • History of colon polyps, 7/28/2020--tubular adenoma x 2.  Hyperplastic x 3.  09/12/2013--hyperplastic ×1. 9/12/2013 July 28, 2020--colonoscopy revealed a 1 mm polyp in the rectum.  #4, 1 to 3 mm polyps in the rectosigmoid colon.  #1, 1 mm polyp in the rectum.  Polyps were removed.  Pathology returned tubular adenoma in the proximal ascending and sigmoid colon.  Hyperplastic x3.  09/12/2013--colonoscopy revealed a 3-4 mm polyp in the sigmoid colon which was biopsied and removed. Otherwise, normal colonoscopy to    • History of pulmonary function tests 08/12/2013 08/12/2013--pulmonary function tests revealed FVC 3.35, 66% of predicted. Post bronchodilator FVC 3.31. FEV1 1.77, 45% of predicted. 1.8 to post bronchodilator. FEV1/FVC 53, Total lung capacity is 105% of predicted. Normal diffusion. Moderately severe obstructive pattern with no reversibility.   • Hyperlipidemia 6/14/2016   • Impaired fasting glucose 3/20/2019   • Nicotine dependence 6/14/2016   • Polycythemia secondary to smoking 7/7/2016 07/07/2016--routine physical exam.  CBC reveals hemoglobin elevated at 17.9 and hematocrit elevated at 53.5.  Patient has a somewhat heavy smoker and I feel this is the etiology of this and will not pursue hematologic workup, at least  at the present time.  Patient is in the process of trying to discontinue cigarettes.  Aspirin 81 mg per day initiated.   • Vitamin D deficiency 2016         Past Surgical History:   Procedure Laterality Date   • COLONOSCOPY  2013--colonoscopy revealed a 3-4 mm polyp in the sigmoid colon which was biopsied and removed. Otherwise, normal colonoscopy to cecum with an excellent prep. Pathology returned hyperplastic.   • COLONOSCOPY N/A 2020--colonoscopy revealed a 1 mm polyp in the rectum.  #4, 1 to 3 mm polyps in the rectosigmoid colon.  #1, 1 mm polyp in the rectum.  Polyps were removed.  Pathology returned tubular adenoma in the proximal ascending and sigmoid colon.  Hyperplastic x3.   • KNEE ARTHROSCOPY Right 33 years of age    Age 33--right meniscus surgery.         No Known Allergies        Current Outpatient Medications:   •  umeclidinium-vilanterol (Anoro Ellipta) 62.5-25 MCG/INH aerosol powder  inhaler, Inhale once daily as directed for COPD, Disp: 3 each, Rfl: 11  •  aspirin 81 MG tablet, Take 1 tablet by mouth daily., Disp: 90 tablet, Rfl: 3  •  cefdinir (OMNICEF) 300 MG capsule, Take 1 p.o. twice daily until gone, Disp: 20 capsule, Rfl: 0  •  predniSONE (DELTASONE) 10 MG tablet, 5 by mouth daily 7 days, then 4 daily 2 days, 3 daily 2 days, 2 daily 2 days, 1 daily 2 days, one half daily 2 days and discontinue., Disp: 56 tablet, Rfl: 0      Family History   Problem Relation Age of Onset   • Anuerysm Brother         Cerebral Artery Aneurysm. Brother  of a brain aneurysm at age 52.   • Hypertension Other         Benign Essential   • Heart disease Other    • Malig Hyperthermia Neg Hx          Social History     Socioeconomic History   • Marital status:      Spouse name: Not on file   • Number of children: 2   • Years of education: Not on file   • Highest education level: 8th grade   Occupational History   • Occupation:  Consultant  "  Social Needs   • Financial resource strain: Not hard at all   • Food insecurity     Worry: Never true     Inability: Never true   • Transportation needs     Medical: No     Non-medical: No   Tobacco Use   • Smoking status: Current Every Day Smoker     Packs/day: 2.00   • Smokeless tobacco: Never Used   Substance and Sexual Activity   • Alcohol use: Yes     Frequency: 4 or more times a week     Drinks per session: 5 or 6     Comment: 6 BEERS A DAY    • Drug use: No   • Sexual activity: Yes     Partners: Female   Lifestyle   • Physical activity     Days per week: 0 days     Minutes per session: 0 min   • Stress: Not at all   Relationships   • Social connections     Talks on phone: More than three times a week     Gets together: Once a week     Attends Muslim service: Never     Active member of club or organization: No     Attends meetings of clubs or organizations: Never     Relationship status:          Vitals:    12/07/20 1031   BP: 130/78   BP Location: Left arm   Patient Position: Sitting   Cuff Size: Adult   Pulse: 85   SpO2: 96%   Weight: 76.7 kg (169 lb)   Height: 180.3 cm (71\")        Body mass index is 23.57 kg/m².      Physical Exam:    General: Alert and oriented x 3.  No acute distress.  Normal affect.  HEENT: Pupils equal, round, reactive to light; extraocular movements intact; sclerae nonicteric; pharynx, ear canals and TMs normal.  Neck: Without JVD, thyromegaly, bruit, or adenopathy.  Lungs: Bilateral scattered wheezes and rhonchi.  No definite consolidation.  Heart: Regular rate and rhythm without murmur, rub, gallop, or click.  Abdomen: Soft, nontender, without hepatosplenomegaly or hernia.  Bowel sounds normal.  : Deferred.  Rectal: Deferred.  Extremities: Without clubbing, cyanosis, edema, or pulse deficit.  Neurologic: Intact without focal deficit.  Normal station and gait observed during ingress and egress from the examination room.  Skin: Without significant lesion.  " Musculoskeletal: Unremarkable.    Lab/other results:      Assessment/Plan:     Diagnosis Plan   1. Acute exacerbation of chronic obstructive pulmonary disease (COPD) (CMS/[facility])  predniSONE (DELTASONE) 10 MG tablet    cefdinir (OMNICEF) 300 MG capsule   2. Chronic obstructive pulmonary disease (COPD)  umeclidinium-vilanterol (Anoro Ellipta) 62.5-25 MCG/INH aerosol powder  inhaler   3. Cigarette nicotine dependence without complication         December 7, 2020--patient with history of COPD who is a smoker presents today with complaints of chest congestion, productive cough, wheezing, and shortness of breath.  No fever but he had chills last night.  No other systemic signs or symptoms.  Symptoms started about 4 days ago.  2 days into the illness, his sputum became green.  On exam there is bilateral wheezes and rhonchi.  Only fair airflow.  No definite signs of consolidation.      Plan is as follows: Prednisone 50 mg p.o. daily x 7 days, taper and discontinue.  Cefdinir 300 mg p.o. twice daily x10 days.  Patient should contact me if his symptoms persist and certainly if they worsen.          Procedures

## 2020-12-31 DIAGNOSIS — J41.1 MUCOPURULENT CHRONIC BRONCHITIS (HCC): Chronic | ICD-10-CM

## 2020-12-31 RX ORDER — ALBUTEROL SULFATE 90 UG/1
2 AEROSOL, METERED RESPIRATORY (INHALATION) EVERY 4 HOURS PRN
Qty: 6.7 G | Refills: 5 | Status: SHIPPED | OUTPATIENT
Start: 2020-12-31 | End: 2022-06-07

## 2020-12-31 NOTE — TELEPHONE ENCOUNTER
There was an albuterol inhaler on his  meds.  I put a script on here for you to approve if you want to, otherwise please delete.

## 2020-12-31 NOTE — TELEPHONE ENCOUNTER
PATIENT CALLED IN AND STATED THAT HE NEEDS A REFILL ON HIS RESCUE INHALER, I COULD NOT FIND THIS IN HIS LIST OF MEDICATIONS. HE STATED THAT THE CURRENT ONE HE HAS, IT HAS  AND HE WOULD LIKE TO HAVE ONE, JUST IN CASE.      STATED THAT THE INHALER IS ALBUTEROL      CALL BACK [phone]    PHARMACY VERIFIED AS [facility] [address], [place]

## 2021-04-13 ENCOUNTER — OFFICE VISIT (OUTPATIENT)
Dept: INTERNAL MEDICINE | Facility: CLINIC | Age: 61
End: 2021-04-13

## 2021-04-13 ENCOUNTER — LAB (OUTPATIENT)
Dept: LAB | Facility: HOSPITAL | Age: 61
End: 2021-04-13

## 2021-04-13 VITALS
HEIGHT: 71 IN | BODY MASS INDEX: 23.83 KG/M2 | HEART RATE: 96 BPM | OXYGEN SATURATION: 97 % | DIASTOLIC BLOOD PRESSURE: 70 MMHG | SYSTOLIC BLOOD PRESSURE: 125 MMHG | WEIGHT: 170.2 LBS

## 2021-04-13 DIAGNOSIS — D75.1 POLYCYTHEMIA SECONDARY TO SMOKING: Chronic | ICD-10-CM

## 2021-04-13 DIAGNOSIS — E55.9 VITAMIN D DEFICIENCY: Chronic | ICD-10-CM

## 2021-04-13 DIAGNOSIS — R39.12 BENIGN PROSTATIC HYPERPLASIA WITH WEAK URINARY STREAM: Chronic | ICD-10-CM

## 2021-04-13 DIAGNOSIS — J41.1 MUCOPURULENT CHRONIC BRONCHITIS (HCC): Chronic | ICD-10-CM

## 2021-04-13 DIAGNOSIS — Z51.81 THERAPEUTIC DRUG MONITORING: ICD-10-CM

## 2021-04-13 DIAGNOSIS — N40.1 BENIGN PROSTATIC HYPERPLASIA WITH WEAK URINARY STREAM: Chronic | ICD-10-CM

## 2021-04-13 DIAGNOSIS — E78.2 MIXED HYPERLIPIDEMIA: Chronic | ICD-10-CM

## 2021-04-13 DIAGNOSIS — Z86.010 HISTORY OF COLON POLYPS: Chronic | ICD-10-CM

## 2021-04-13 DIAGNOSIS — F17.210 CIGARETTE NICOTINE DEPENDENCE WITHOUT COMPLICATION: Chronic | ICD-10-CM

## 2021-04-13 DIAGNOSIS — Z00.00 ROUTINE PHYSICAL EXAMINATION: Primary | ICD-10-CM

## 2021-04-13 DIAGNOSIS — R73.01 IMPAIRED FASTING GLUCOSE: Chronic | ICD-10-CM

## 2021-04-13 PROBLEM — J44.1 ACUTE EXACERBATION OF CHRONIC OBSTRUCTIVE PULMONARY DISEASE (COPD) (HCC): Status: RESOLVED | Noted: 2020-12-07 | Resolved: 2021-04-13

## 2021-04-13 PROBLEM — R82.998 CALCIUM OXALATE CRYSTALS PRESENT IN URINE: Chronic | Status: RESOLVED | Noted: 2019-03-20 | Resolved: 2021-04-13

## 2021-04-13 PROCEDURE — 80053 COMPREHEN METABOLIC PANEL: CPT | Performed by: INTERNAL MEDICINE

## 2021-04-13 PROCEDURE — 84439 ASSAY OF FREE THYROXINE: CPT | Performed by: INTERNAL MEDICINE

## 2021-04-13 PROCEDURE — 84443 ASSAY THYROID STIM HORMONE: CPT | Performed by: INTERNAL MEDICINE

## 2021-04-13 PROCEDURE — 83704 LIPOPROTEIN BLD QUAN PART: CPT | Performed by: INTERNAL MEDICINE

## 2021-04-13 PROCEDURE — 83036 HEMOGLOBIN GLYCOSYLATED A1C: CPT | Performed by: INTERNAL MEDICINE

## 2021-04-13 PROCEDURE — 84153 ASSAY OF PSA TOTAL: CPT | Performed by: INTERNAL MEDICINE

## 2021-04-13 PROCEDURE — 84481 FREE ASSAY (FT-3): CPT | Performed by: INTERNAL MEDICINE

## 2021-04-13 PROCEDURE — 80061 LIPID PANEL: CPT | Performed by: INTERNAL MEDICINE

## 2021-04-13 PROCEDURE — 82306 VITAMIN D 25 HYDROXY: CPT | Performed by: INTERNAL MEDICINE

## 2021-04-13 PROCEDURE — 85027 COMPLETE CBC AUTOMATED: CPT | Performed by: INTERNAL MEDICINE

## 2021-04-13 PROCEDURE — 99396 PREV VISIT EST AGE 40-64: CPT | Performed by: INTERNAL MEDICINE

## 2021-04-13 PROCEDURE — 81003 URINALYSIS AUTO W/O SCOPE: CPT | Performed by: INTERNAL MEDICINE

## 2021-04-13 NOTE — PROGRESS NOTES
04/13/2021    Patient Information  Hammad Justice                                                                                          [address]  [facility] [address]      1960  [unfilled]  There is no work phone number on file.    Chief Complaint:     Routine annual physical examination and follow-up blood work in order to monitor chronic medical issues listed in history of present illness.  No new acute complaints.    History of Present Illness:    Patient with a history of impaired fasting glucose, hyperlipidemia, COPD, history of colon polyps, BPH, vitamin D deficiency, polycythemia secondary to cigarette smoking.  He presents today for his routine annual physical exam and follow-up blood work.  His past medical history reviewed and updated were necessary including health maintenance parameters.  This reveals he needs a Covid vaccination which I have strongly recommended particularly given his COPD.    Review of Systems   Constitutional: Negative.   HENT: Negative.    Eyes: Negative.    Cardiovascular: Negative.    Respiratory: Negative.         Smoker's cough   Endocrine: Negative.    Hematologic/Lymphatic: Negative.    Skin: Negative.    Musculoskeletal: Negative.    Gastrointestinal: Negative.    Genitourinary: Negative.    Neurological: Negative.    Psychiatric/Behavioral: Negative.    Allergic/Immunologic: Negative.        Active Problems:    Patient Active Problem List   Diagnosis   • Chronic obstructive pulmonary disease (COPD)   • History of colon polyps, 7/28/2020--tubular adenoma x 2.  Hyperplastic x 3.  09/12/2013--hyperplastic ×1.   • Generalized osteoarthritis of multiple sites   • Hyperlipidemia   • Cigarette nicotine dependence without complication   • Vitamin D deficiency   • Therapeutic drug monitoring   • Routine physical examination   • Encounter for smoking cessation counseling   • Polycythemia secondary to smoking   • Benign prostatic hyperplasia with weak urinary  stream   • Impaired fasting glucose         Past Medical History:   Diagnosis Date   • Calcium oxalate crystals present in urine 3/20/2019    March 20, 2019--patient presents with a progressively worsening decreased urine flow over the past year or so.  He reports he just takes a long time for him to initiate and complete urination.  He has no pain or dysuria.  No blood noted in his urine.  Urinalysis today reveals 31-50 WBCs but there are 3-6 epithelial cells present as well as 1+ calcium oxalate crystals.  No bacteria seen.  No red bl   • Chronic obstructive pulmonary disease (COPD) 8/12/2013 08/12/2013--pulmonary function tests revealed FVC 3.35, 66% of predicted. Post bronchodilator FVC 3.31. FEV1 1.77, 45% of predicted. 1.8 to post bronchodilator. FEV1/FVC 53, Total lung capacity is 105% of predicted. Normal diffusion. Moderately severe obstructive pattern with no reversibility.   08/12/2013--pulmonary consult. Pulmonary function tests revealed moderate to severe obstructive pattern   • Cigarette nicotine dependence without complication 6/14/2016   • COPD (chronic obstructive pulmonary disease) (CMS/[facility]) 8/12/2013 08/12/2013--pulmonary function tests revealed FVC 3.35, 66% of predicted. Post bronchodilator FVC 3.31. FEV1 1.77, 45% of predicted. 1.8 to post bronchodilator. FEV1/FVC 53, Total lung capacity is 105% of predicted. Normal diffusion. Moderately severe obstructive pattern with no reversibility.   08/12/2013--pulmonary consult. Pulmonary function tests revealed moderate to severe obstructive pattern. Diagnosis COPD. Chronic bronchitis type. Patient was started on Ventolin when necessary and Spiriva HandiHaler use once daily.   • Generalized osteoarthritis of multiple sites 6/14/2016   • History of Benign essential hypertension 04/04/2012 04/04/2012--blood pressure remains low and patient had to discontinue lisinopril because of dizziness.   03/14/2012--blood pressure too low at 102/64.  Lisinopril HCT decreased to 10/12.5.   02/22/2012--blood pressure elevated at 140/90. Lisinopril HCT 20/12.5 daily initiated.   • History of colon polyps, 7/28/2020--tubular adenoma x 2.  Hyperplastic x 3.  09/12/2013--hyperplastic ×1. 9/12/2013 July 28, 2020--colonoscopy revealed a 1 mm polyp in the rectum.  #4, 1 to 3 mm polyps in the rectosigmoid colon.  #1, 1 mm polyp in the rectum.  Polyps were removed.  Pathology returned tubular adenoma in the proximal ascending and sigmoid colon.  Hyperplastic x3.  09/12/2013--colonoscopy revealed a 3-4 mm polyp in the sigmoid colon which was biopsied and removed. Otherwise, normal colonoscopy to    • History of pulmonary function tests 08/12/2013 08/12/2013--pulmonary function tests revealed FVC 3.35, 66% of predicted. Post bronchodilator FVC 3.31. FEV1 1.77, 45% of predicted. 1.8 to post bronchodilator. FEV1/FVC 53, Total lung capacity is 105% of predicted. Normal diffusion. Moderately severe obstructive pattern with no reversibility.   • Hyperlipidemia 6/14/2016   • Impaired fasting glucose 3/20/2019   • Nicotine dependence 6/14/2016   • Polycythemia secondary to smoking 7/7/2016 07/07/2016--routine physical exam.  CBC reveals hemoglobin elevated at 17.9 and hematocrit elevated at 53.5.  Patient has a somewhat heavy smoker and I feel this is the etiology of this and will not pursue hematologic workup, at least at the present time.  Patient is in the process of trying to discontinue cigarettes.  Aspirin 81 mg per day initiated.   • Vitamin D deficiency 6/14/2016         Past Surgical History:   Procedure Laterality Date   • COLONOSCOPY  09/12/2013 09/12/2013--colonoscopy revealed a 3-4 mm polyp in the sigmoid colon which was biopsied and removed. Otherwise, normal colonoscopy to cecum with an excellent prep. Pathology returned hyperplastic.   • COLONOSCOPY N/A 7/28/2020 July 28, 2020--colonoscopy revealed a 1 mm polyp in the rectum.  #4, 1 to 3 mm polyps in  "the rectosigmoid colon.  #1, 1 mm polyp in the rectum.  Polyps were removed.  Pathology returned tubular adenoma in the proximal ascending and sigmoid colon.  Hyperplastic x3.   • KNEE ARTHROSCOPY Right 33 years of age    Age 33--right meniscus surgery.         No Known Allergies        Current Outpatient Medications:   •  albuterol sulfate HFA (Ventolin HFA) 108 (90 Base) MCG/ACT inhaler, Inhale 2 puffs Every 4 (Four) Hours As Needed for Wheezing., Disp: 6.7 g, Rfl: 5  •  aspirin 81 MG tablet, Take 1 tablet by mouth daily., Disp: 90 tablet, Rfl: 3  •  umeclidinium-vilanterol (Anoro Ellipta) 62.5-25 MCG/INH aerosol powder  inhaler, Inhale once daily as directed for COPD, Disp: 3 each, Rfl: 11      Family History   Problem Relation Age of Onset   • Anuerysm Brother         Cerebral Artery Aneurysm. Brother  of a brain aneurysm at age 52.   • Hypertension Other         Benign Essential   • Heart disease Other    • Malig Hyperthermia Neg Hx          Social History     Socioeconomic History   • Marital status:      Spouse name: Not on file   • Number of children: 2   • Years of education: Not on file   • Highest education level: 8th grade   Tobacco Use   • Smoking status: Current Every Day Smoker     Packs/day: 2.00   • Smokeless tobacco: Never Used   Vaping Use   • Vaping Use: Never used   Substance and Sexual Activity   • Alcohol use: Yes     Comment: 6 BEERS A DAY    • Drug use: No   • Sexual activity: Yes     Partners: Female         Vitals:    21 0805   BP: 125/70   Pulse: 96   SpO2: 97%   Weight: 77.2 kg (170 lb 3.2 oz)   Height: 180.3 cm (71\")        Body mass index is 23.74 kg/m².      Physical Exam:    General: Alert and oriented x 3.  No acute distress.  Normal affect.  HEENT: Pupils equal, round, reactive to light; extraocular movements intact; sclerae nonicteric; pharynx, ear canals and TMs normal.  Neck: Without JVD, thyromegaly, bruit, or adenopathy.  Lungs: Clear to auscultation in all " fields today.  Heart: Regular rate and rhythm without murmur, rub, gallop, or click.  Abdomen: Soft, nontender, without hepatosplenomegaly or hernia.  Bowel sounds normal.  : Deferred.  Rectal: Deferred.  Extremities: Without clubbing, cyanosis, edema, or pulse deficit.  Neurologic: Intact without focal deficit.  Normal station and gait observed during ingress and egress from the examination room.  Skin: Without significant lesion.  Musculoskeletal: Unremarkable.    Lab/other results:    Patient did not have his lab work done.    Assessment/Plan:     Diagnosis Plan   1. Routine physical examination  CBC (No Diff)    Comprehensive Metabolic Panel    Hemoglobin A1c    NMR LipoProfile    Urinalysis With Microscopic If Indicated (No Culture) - Urine, Clean Catch    TSH    T4, Free    T3, Free    PSA DIAGNOSTIC   2. Impaired fasting glucose  Comprehensive Metabolic Panel    Hemoglobin A1c   3. Hyperlipidemia  Comprehensive Metabolic Panel    NMR LipoProfile    TSH    T4, Free    T3, Free   4. Chronic obstructive pulmonary disease (COPD)     5. History of colon polyps, 7/28/2020--tubular adenoma x 2.  Hyperplastic x 3.  09/12/2013--hyperplastic ×1.     6. Benign prostatic hyperplasia with weak urinary stream  Urinalysis With Microscopic If Indicated (No Culture) - Urine, Clean Catch    PSA DIAGNOSTIC   7. Vitamin D deficiency     8. Polycythemia secondary to smoking  CBC (No Diff)   9. Cigarette nicotine dependence without complication     10. Therapeutic drug monitoring       Patient presents today with essentially normal annual physical as much as can be determined given the fact that he did not have his lab work done today.  He has impaired fasting glucose and hyperlipidemia that needs to be assessed with lab work.  In regards to his COPD it seems fairly stable.  Patient did have an exacerbation of COPD a while back and completely got over that.  He has a history of colon polyps and is up-to-date on his colonoscopy.   His BPH symptoms are tolerable.  Vitamin D needs to be assessed as well as his polycythemia that is due to cigarette smoking.  In regards to cigarette smoking, I have not counseled patient today as I have been in the past and he is not ready to commit to quit smoking.  I explained to him that this will likely shorten his life.    Several preventative health issues discussed including review of vaccinations and recommendations, including dietary issues, exercise and weight loss.  Safe sex practices discussed.  Patient advised to wear seatbelt whenever driving and avoid texting and driving.  Also advised to look both ways before crossing the street.  Colon cancer prevention discussed and is up-to-date with colonoscopy.  Advised to avoid tobacco products and minimize alcohol consumption.    Plan is as follows: We will set up fasting lab work in the near future.  Patient will follow-up on the phone for the results and possible further instructions.  Otherwise, he will follow-up in 1 year with lab prior for his annual exam or follow-up as needed.    Procedures

## 2021-05-19 ENCOUNTER — TELEPHONE (OUTPATIENT)
Dept: INTERNAL MEDICINE | Facility: CLINIC | Age: 61
End: 2021-05-19

## 2021-05-19 DIAGNOSIS — J41.1 MUCOPURULENT CHRONIC BRONCHITIS (HCC): Primary | ICD-10-CM

## 2021-05-19 DIAGNOSIS — J20.9 ACUTE BRONCHITIS WITH BRONCHOSPASM: ICD-10-CM

## 2021-05-19 RX ORDER — LEVOFLOXACIN 750 MG/1
TABLET ORAL
Qty: 8 TABLET | Refills: 0 | Status: SHIPPED | OUTPATIENT
Start: 2021-05-19 | End: 2021-12-02

## 2021-05-19 RX ORDER — PREDNISONE 10 MG/1
TABLET ORAL
Qty: 46 TABLET | Refills: 0 | Status: SHIPPED | OUTPATIENT
Start: 2021-05-19 | End: 2021-12-02

## 2021-05-19 NOTE — TELEPHONE ENCOUNTER
Caller: Hammad Justice    Relationship: Self    Best call back number: [phone]    What medication are you requesting: ANTIBOTICS  What are your current symptoms: COUGH UP GREEN PHLEGM     How long have you been experiencing symptoms: 2 WEEKS  Have you had these symptoms before:    [x] Yes  [] No    Have you been treated for these symptoms before:   [x] Yes  [] No    If a prescription is needed, what is your preferred [facility] and phone number[place] [facility] [place] [address] - [phone] [facility] [phone]      Additional notes: TRIED OVER THE COUNTER MUSINEX NOT HELPING

## 2021-05-19 NOTE — TELEPHONE ENCOUNTER
I sent 3 prescriptions to his [facility] pharmacy, 1 is an antibiotic Levaquin, 2 is prednisone, and 3 is Tussionex for cough and congestion.  Patient needs to see me if symptoms do not resolve and certainly if they worsen.

## 2021-12-02 ENCOUNTER — OFFICE VISIT (OUTPATIENT)
Dept: INTERNAL MEDICINE | Facility: CLINIC | Age: 61
End: 2021-12-02

## 2021-12-02 ENCOUNTER — HOSPITAL ENCOUNTER (OUTPATIENT)
Dept: GENERAL RADIOLOGY | Facility: HOSPITAL | Age: 61
Discharge: HOME | End: 2021-12-02

## 2021-12-02 ENCOUNTER — LAB (OUTPATIENT)
Dept: LAB | Facility: HOSPITAL | Age: 61
End: 2021-12-02

## 2021-12-02 VITALS
WEIGHT: 168 LBS | SYSTOLIC BLOOD PRESSURE: 126 MMHG | DIASTOLIC BLOOD PRESSURE: 72 MMHG | OXYGEN SATURATION: 97 % | BODY MASS INDEX: 23.52 KG/M2 | HEART RATE: 88 BPM | RESPIRATION RATE: 18 BRPM | HEIGHT: 71 IN

## 2021-12-02 DIAGNOSIS — D75.1 POLYCYTHEMIA SECONDARY TO SMOKING: ICD-10-CM

## 2021-12-02 DIAGNOSIS — R53.82 CHRONIC FATIGUE: ICD-10-CM

## 2021-12-02 DIAGNOSIS — R39.12 BENIGN PROSTATIC HYPERPLASIA WITH WEAK URINARY STREAM: Chronic | ICD-10-CM

## 2021-12-02 DIAGNOSIS — R35.1 NOCTURIA: ICD-10-CM

## 2021-12-02 DIAGNOSIS — Z51.81 THERAPEUTIC DRUG MONITORING: ICD-10-CM

## 2021-12-02 DIAGNOSIS — Z12.2 SCREENING FOR LUNG CANCER: ICD-10-CM

## 2021-12-02 DIAGNOSIS — N40.1 BENIGN PROSTATIC HYPERPLASIA WITH WEAK URINARY STREAM: Chronic | ICD-10-CM

## 2021-12-02 DIAGNOSIS — J41.1 MUCOPURULENT CHRONIC BRONCHITIS (HCC): ICD-10-CM

## 2021-12-02 DIAGNOSIS — R73.01 IMPAIRED FASTING GLUCOSE: Primary | Chronic | ICD-10-CM

## 2021-12-02 DIAGNOSIS — E78.2 MIXED HYPERLIPIDEMIA: ICD-10-CM

## 2021-12-02 DIAGNOSIS — F17.210 CIGARETTE NICOTINE DEPENDENCE WITHOUT COMPLICATION: ICD-10-CM

## 2021-12-02 LAB
ALBUMIN SERPL-MCNC: 4.4 G/DL (ref 3.5–5.2)
ALBUMIN/GLOB SERPL: 1.9 G/DL
ALP SERPL-CCNC: 99 U/L (ref 39–117)
ALT SERPL W P-5'-P-CCNC: 24 U/L (ref 1–41)
ANION GAP SERPL CALCULATED.3IONS-SCNC: 10 MMOL/L (ref 5–15)
AST SERPL-CCNC: 23 U/L (ref 1–40)
BASOPHILS # BLD AUTO: 0.05 10*3/MM3 (ref 0–0.2)
BASOPHILS NFR BLD AUTO: 0.6 % (ref 0–1.5)
BILIRUB SERPL-MCNC: 0.3 MG/DL (ref 0–1.2)
BILIRUB UR QL STRIP: NEGATIVE
BUN SERPL-MCNC: 15 MG/DL (ref 6–20)
BUN/CREAT SERPL: 16.7 (ref 7–25)
CALCIUM SPEC-SCNC: 9.2 MG/DL (ref 8.6–10.5)
CHLORIDE SERPL-SCNC: 102 MMOL/L (ref 98–107)
CK SERPL-CCNC: 206 U/L (ref 20–200)
CLARITY UR: CLEAR
CO2 SERPL-SCNC: 27 MMOL/L (ref 22–29)
COLOR UR: YELLOW
CREAT SERPL-MCNC: 0.9 MG/DL (ref 0.76–1.27)
CRP SERPL-MCNC: 0.38 MG/DL (ref 0–0.5)
DEPRECATED RDW RBC AUTO: 40.4 FL (ref 37–54)
EOSINOPHIL # BLD AUTO: 0.21 10*3/MM3 (ref 0–0.4)
EOSINOPHIL NFR BLD AUTO: 2.7 % (ref 0.3–6.2)
ERYTHROCYTE [DISTWIDTH] IN BLOOD BY AUTOMATED COUNT: 11.7 % (ref 12.3–15.4)
ERYTHROCYTE [SEDIMENTATION RATE] IN BLOOD: 12 MM/HR (ref 0–20)
GFR SERPL CREATININE-BSD FRML MDRD: 87 ML/MIN/1.73
GLOBULIN UR ELPH-MCNC: 2.3 GM/DL
GLUCOSE SERPL-MCNC: 94 MG/DL (ref 65–99)
GLUCOSE UR STRIP-MCNC: ABNORMAL MG/DL
HBA1C MFR BLD: 5.75 % (ref 4.8–5.6)
HCT VFR BLD AUTO: 46.2 % (ref 37.5–51)
HGB BLD-MCNC: 16.1 G/DL (ref 13–17.7)
HGB UR QL STRIP.AUTO: NEGATIVE
IMM GRANULOCYTES # BLD AUTO: 0.04 10*3/MM3 (ref 0–0.05)
IMM GRANULOCYTES NFR BLD AUTO: 0.5 % (ref 0–0.5)
KETONES UR QL STRIP: NEGATIVE
LEUKOCYTE ESTERASE UR QL STRIP.AUTO: NEGATIVE
LYMPHOCYTES # BLD AUTO: 1.52 10*3/MM3 (ref 0.7–3.1)
LYMPHOCYTES NFR BLD AUTO: 19.3 % (ref 19.6–45.3)
MCH RBC QN AUTO: 32.9 PG (ref 26.6–33)
MCHC RBC AUTO-ENTMCNC: 34.8 G/DL (ref 31.5–35.7)
MCV RBC AUTO: 94.5 FL (ref 79–97)
MONOCYTES # BLD AUTO: 1.02 10*3/MM3 (ref 0.1–0.9)
MONOCYTES NFR BLD AUTO: 13 % (ref 5–12)
NEUTROPHILS NFR BLD AUTO: 5.03 10*3/MM3 (ref 1.7–7)
NEUTROPHILS NFR BLD AUTO: 63.9 % (ref 42.7–76)
NITRITE UR QL STRIP: NEGATIVE
NRBC BLD AUTO-RTO: 0 /100 WBC (ref 0–0.2)
PH UR STRIP.AUTO: 6 [PH] (ref 5–8)
PLATELET # BLD AUTO: 233 10*3/MM3 (ref 140–450)
PMV BLD AUTO: 10.9 FL (ref 6–12)
POTASSIUM SERPL-SCNC: 4 MMOL/L (ref 3.5–5.2)
PROT SERPL-MCNC: 6.7 G/DL (ref 6–8.5)
PROT UR QL STRIP: NEGATIVE
PSA SERPL-MCNC: 1.6 NG/ML (ref 0–4)
RBC # BLD AUTO: 4.89 10*6/MM3 (ref 4.14–5.8)
SODIUM SERPL-SCNC: 139 MMOL/L (ref 136–145)
SP GR UR STRIP: 1.02 (ref 1–1.03)
T3FREE SERPL-MCNC: 3.34 PG/ML (ref 2–4.4)
T4 FREE SERPL-MCNC: 1.03 NG/DL (ref 0.93–1.7)
TSH SERPL DL<=0.05 MIU/L-ACNC: 0.81 UIU/ML (ref 0.27–4.2)
UROBILINOGEN UR QL STRIP: ABNORMAL
WBC NRBC COR # BLD: 7.87 10*3/MM3 (ref 3.4–10.8)

## 2021-12-02 PROCEDURE — 85025 COMPLETE CBC W/AUTO DIFF WBC: CPT | Performed by: INTERNAL MEDICINE

## 2021-12-02 PROCEDURE — 84439 ASSAY OF FREE THYROXINE: CPT | Performed by: INTERNAL MEDICINE

## 2021-12-02 PROCEDURE — 81003 URINALYSIS AUTO W/O SCOPE: CPT | Performed by: INTERNAL MEDICINE

## 2021-12-02 PROCEDURE — 86140 C-REACTIVE PROTEIN: CPT | Performed by: INTERNAL MEDICINE

## 2021-12-02 PROCEDURE — 85652 RBC SED RATE AUTOMATED: CPT | Performed by: INTERNAL MEDICINE

## 2021-12-02 PROCEDURE — 99214 OFFICE O/P EST MOD 30 MIN: CPT | Performed by: INTERNAL MEDICINE

## 2021-12-02 PROCEDURE — 84481 FREE ASSAY (FT-3): CPT | Performed by: INTERNAL MEDICINE

## 2021-12-02 PROCEDURE — 80053 COMPREHEN METABOLIC PANEL: CPT | Performed by: INTERNAL MEDICINE

## 2021-12-02 PROCEDURE — 71046 X-RAY EXAM CHEST 2 VIEWS: CPT

## 2021-12-02 PROCEDURE — 84410 TESTOSTERONE BIOAVAILABLE: CPT | Performed by: INTERNAL MEDICINE

## 2021-12-02 PROCEDURE — 84443 ASSAY THYROID STIM HORMONE: CPT | Performed by: INTERNAL MEDICINE

## 2021-12-02 PROCEDURE — 84153 ASSAY OF PSA TOTAL: CPT | Performed by: INTERNAL MEDICINE

## 2021-12-02 PROCEDURE — 36415 COLL VENOUS BLD VENIPUNCTURE: CPT | Performed by: INTERNAL MEDICINE

## 2021-12-02 PROCEDURE — 82550 ASSAY OF CK (CPK): CPT | Performed by: INTERNAL MEDICINE

## 2021-12-02 PROCEDURE — 83036 HEMOGLOBIN GLYCOSYLATED A1C: CPT | Performed by: INTERNAL MEDICINE

## 2021-12-02 NOTE — PROGRESS NOTES
12/02/2021    Patient Information  Hammad Justice                                                                                          [address]  [facility] [address]      1960  [unfilled]  There is no work phone number on file.    Chief Complaint:     Complaining of fatigue.  Concern about possibly side effects from Covid vaccination.    History of Present Illness:    Patient is a cigarette smoker with COPD and has history of colon polyps, generalized osteoarthritis, hyperlipidemia, secondary polycythemia, impaired fasting glucose, and symptomatic BPH.  He presents today with complaints of fatigue and feels this possibly is related to his second Covid-19 vaccine.  History will be described below.  Past medical history reviewed and updated were necessary including health maintenance parameters.  This reveals he is currently up-to-date or else accounted for.    Review of Systems   Constitutional: Positive for malaise/fatigue. Negative for chills and fever.   HENT: Negative.    Eyes: Negative.    Cardiovascular: Negative.  Negative for chest pain and leg swelling.   Respiratory: Positive for cough and wheezing.    Endocrine: Negative.    Hematologic/Lymphatic: Negative.    Skin: Negative.    Musculoskeletal: Positive for arthritis and joint pain. Negative for myalgias.   Gastrointestinal: Negative.    Genitourinary: Negative.         Nocturia 3-4 times per night.  Weak urinary stream.   Neurological: Negative.    Psychiatric/Behavioral: Negative.    Allergic/Immunologic: Negative.        Active Problems:    Patient Active Problem List   Diagnosis   • Chronic obstructive pulmonary disease (COPD)   • History of colon polyps, 7/28/2020--tubular adenoma x 2.  Hyperplastic x 3.  09/12/2013--hyperplastic ×1.   • Generalized osteoarthritis of multiple sites   • Hyperlipidemia   • Cigarette nicotine dependence without complication   • Vitamin D deficiency   • Therapeutic drug monitoring   • Routine  physical examination   • Encounter for smoking cessation counseling   • Polycythemia secondary to smoking   • Benign prostatic hyperplasia with weak urinary stream   • Impaired fasting glucose   • Chronic fatigue         Past Medical History:   Diagnosis Date   • Calcium oxalate crystals present in urine 3/20/2019    March 20, 2019--patient presents with a progressively worsening decreased urine flow over the past year or so.  He reports he just takes a long time for him to initiate and complete urination.  He has no pain or dysuria.  No blood noted in his urine.  Urinalysis today reveals 31-50 WBCs but there are 3-6 epithelial cells present as well as 1+ calcium oxalate crystals.  No bacteria seen.  No red bl   • Chronic obstructive pulmonary disease (COPD) 8/12/2013 08/12/2013--pulmonary function tests revealed FVC 3.35, 66% of predicted. Post bronchodilator FVC 3.31. FEV1 1.77, 45% of predicted. 1.8 to post bronchodilator. FEV1/FVC 53, Total lung capacity is 105% of predicted. Normal diffusion. Moderately severe obstructive pattern with no reversibility.   08/12/2013--pulmonary consult. Pulmonary function tests revealed moderate to severe obstructive pattern   • Cigarette nicotine dependence without complication 6/14/2016   • COPD (chronic obstructive pulmonary disease) ([facility]) 8/12/2013 08/12/2013--pulmonary function tests revealed FVC 3.35, 66% of predicted. Post bronchodilator FVC 3.31. FEV1 1.77, 45% of predicted. 1.8 to post bronchodilator. FEV1/FVC 53, Total lung capacity is 105% of predicted. Normal diffusion. Moderately severe obstructive pattern with no reversibility.   08/12/2013--pulmonary consult. Pulmonary function tests revealed moderate to severe obstructive pattern. Diagnosis COPD. Chronic bronchitis type. Patient was started on Ventolin when necessary and Spiriva HandiHaler use once daily.   • Generalized osteoarthritis of multiple sites 6/14/2016   • History of Benign essential hypertension  04/04/2012 04/04/2012--blood pressure remains low and patient had to discontinue lisinopril because of dizziness.   03/14/2012--blood pressure too low at 102/64. Lisinopril HCT decreased to 10/12.5.   02/22/2012--blood pressure elevated at 140/90. Lisinopril HCT 20/12.5 daily initiated.   • History of colon polyps, 7/28/2020--tubular adenoma x 2.  Hyperplastic x 3.  09/12/2013--hyperplastic ×1. 9/12/2013 July 28, 2020--colonoscopy revealed a 1 mm polyp in the rectum.  #4, 1 to 3 mm polyps in the rectosigmoid colon.  #1, 1 mm polyp in the rectum.  Polyps were removed.  Pathology returned tubular adenoma in the proximal ascending and sigmoid colon.  Hyperplastic x3.  09/12/2013--colonoscopy revealed a 3-4 mm polyp in the sigmoid colon which was biopsied and removed. Otherwise, normal colonoscopy to    • History of pulmonary function tests 08/12/2013 08/12/2013--pulmonary function tests revealed FVC 3.35, 66% of predicted. Post bronchodilator FVC 3.31. FEV1 1.77, 45% of predicted. 1.8 to post bronchodilator. FEV1/FVC 53, Total lung capacity is 105% of predicted. Normal diffusion. Moderately severe obstructive pattern with no reversibility.   • Hyperlipidemia 6/14/2016   • Impaired fasting glucose 3/20/2019   • Nicotine dependence 6/14/2016   • Polycythemia secondary to smoking 7/7/2016 07/07/2016--routine physical exam.  CBC reveals hemoglobin elevated at 17.9 and hematocrit elevated at 53.5.  Patient has a somewhat heavy smoker and I feel this is the etiology of this and will not pursue hematologic workup, at least at the present time.  Patient is in the process of trying to discontinue cigarettes.  Aspirin 81 mg per day initiated.   • Vitamin D deficiency 6/14/2016         Past Surgical History:   Procedure Laterality Date   • COLONOSCOPY  09/12/2013 09/12/2013--colonoscopy revealed a 3-4 mm polyp in the sigmoid colon which was biopsied and removed. Otherwise, normal colonoscopy to cecum with an  "excellent prep. Pathology returned hyperplastic.   • COLONOSCOPY N/A 2020--colonoscopy revealed a 1 mm polyp in the rectum.  #4, 1 to 3 mm polyps in the rectosigmoid colon.  #1, 1 mm polyp in the rectum.  Polyps were removed.  Pathology returned tubular adenoma in the proximal ascending and sigmoid colon.  Hyperplastic x3.   • KNEE ARTHROSCOPY Right 33 years of age    Age 33--right meniscus surgery.         No Known Allergies        Current Outpatient Medications:   •  albuterol sulfate HFA (Ventolin HFA) 108 (90 Base) MCG/ACT inhaler, Inhale 2 puffs Every 4 (Four) Hours As Needed for Wheezing., Disp: 6.7 g, Rfl: 5  •  aspirin 81 MG tablet, Take 1 tablet by mouth daily., Disp: 90 tablet, Rfl: 3  •  umeclidinium-vilanterol (Anoro Ellipta) 62.5-25 MCG/INH aerosol powder  inhaler, Inhale once daily as directed for COPD, Disp: 3 each, Rfl: 11      Family History   Problem Relation Age of Onset   • Anuerysm Brother         Cerebral Artery Aneurysm. Brother  of a brain aneurysm at age 52.   • Hypertension Other         Benign Essential   • Heart disease Other    • Malig Hyperthermia Neg Hx          Social History     Socioeconomic History   • Marital status:    • Number of children: 2   • Highest education level: 8th grade   Tobacco Use   • Smoking status: Current Every Day Smoker     Packs/day: 2.00   • Smokeless tobacco: Never Used   Vaping Use   • Vaping Use: Never used   Substance and Sexual Activity   • Alcohol use: Yes     Comment: 6 BEERS A DAY    • Drug use: No   • Sexual activity: Yes     Partners: Female         Vitals:    21 1521   BP: 126/72   Pulse: 88   Resp: 18   SpO2: 97%   Weight: 76.2 kg (168 lb)   Height: 180.3 cm (71\")        Body mass index is 23.43 kg/m².      Physical Exam:    General: Alert and oriented x 3.  No acute distress.  Normal affect.  HEENT: Pupils equal, round, reactive to light; extraocular movements intact; sclerae nonicteric; pharynx, ear canals " and TMs normal.  Neck: Without JVD, thyromegaly, bruit, or adenopathy.  Lungs: Clear to auscultation in all fields.  Heart: Regular rate and rhythm without murmur, rub, gallop, or click.  Abdomen: Soft, nontender, without hepatosplenomegaly or hernia.  Bowel sounds normal.  : Deferred.  Rectal: Deferred.  Extremities: Without clubbing, cyanosis, edema, or pulse deficit.  Neurologic: Intact without focal deficit.  Normal station and gait observed during ingress and egress from the examination room.  Skin: Without significant lesion.  Musculoskeletal: Unremarkable.    Lab/other results:      Assessment/Plan:     Diagnosis Plan   1. Impaired fasting glucose  Hemoglobin A1c   2. Polycythemia secondary to smoking  CBC & Differential   3. Therapeutic drug monitoring  CBC & Differential    CK    Comprehensive Metabolic Panel    Hemoglobin A1c    C-reactive Protein    Sedimentation Rate    Urinalysis With Microscopic If Indicated (No Culture) - Urine, Clean Catch    T4, Free    T3, Free    PSA DIAGNOSTIC    TSH    Testosterone,Free+Weakly Bound   4. Benign prostatic hyperplasia with weak urinary stream  PSA DIAGNOSTIC   5. Chronic fatigue  CBC & Differential    CK    Comprehensive Metabolic Panel    Hemoglobin A1c    C-reactive Protein    Sedimentation Rate    Urinalysis With Microscopic If Indicated (No Culture) - Urine, Clean Catch    T4, Free    T3, Free    PSA DIAGNOSTIC    TSH    Testosterone,Free+Weakly Bound   6. Hyperlipidemia  CK   7. Nocturia  Urinalysis With Microscopic If Indicated (No Culture) - Urine, Clean Catch    PSA DIAGNOSTIC   8. Chronic obstructive pulmonary disease (COPD)  XR Chest PA & Lateral   9. Screening for lung cancer  CT Chest Low Dose Wo   10. Cigarette nicotine dependence without complication  CT Chest Low Dose Wo     December 2, 2021--patient reports that he received his second Covid 19 vaccination September 14, 2021 and since that time he has had marked fatigue.  He just does not feel  well.  He has joint pain but no myalgias.  No fever chills, no change of taste or smell.  He just feels exhausted.  His got no shortness of breath but he just feels like he does not have enough oxygen.  Patient has COPD and he does continue to smoke.  He has a history of colon polyps but is up-to-date on his colonoscopy.  He also has generalized osteoarthritis of multiple sites but has had no inflammatory arthritis that we know of.  He has polycythemia related to his smoking but this has not been severe.  He has symptomatic BPH and has had a recent PSA which was normal.  He also has impaired fasting glucose that should be reassessed given the symptoms.  On exam today his vital signs are essentially normal.  Blood pressure 126/72.  Pulse is 88.  Respiratory rate is 18.  His BMI is 23.4.  Oxygen saturation 97%.  Lung examination reveals clear lungs except breath sounds are diminished bilaterally.  I do not appreciate any wheezing.  His heart is regular rate and rhythm without murmur rub or gallop.      Plan is as follows: I am going to send patient over to the lab for the following lab work: CBC with differential, CMP, PSA, sed rate, hemoglobin A1c CRP, SARS antibodies, thyroid function tests, urinalysis with reflex to culture.  I will follow up on the phone with the patient regarding the results and possible further instructions.    Addendum: Also patient needs to have a chest x-ray.      Procedures

## 2021-12-07 ENCOUNTER — TELEPHONE (OUTPATIENT)
Dept: INTERNAL MEDICINE | Facility: CLINIC | Age: 61
End: 2021-12-07

## 2021-12-07 NOTE — TELEPHONE ENCOUNTER
Patient notified of lab results, expressed understanding. He will be having a chest CT on 12/14/21.

## 2021-12-08 LAB
TESTOST SERPL-MCNC: 404 NG/DL (ref 264–916)
TESTOSTERONE.FREE+WB MFR SERPL: 10.7 % (ref 9–46)
TESTOSTERONE.FREE+WB SERPL-MCNC: 43.2 NG/DL (ref 40–250)

## 2021-12-10 ENCOUNTER — APPOINTMENT (OUTPATIENT)
Dept: CT IMAGING | Facility: HOSPITAL | Age: 61
End: 2021-12-10

## 2021-12-13 ENCOUNTER — APPOINTMENT (OUTPATIENT)
Dept: CT IMAGING | Facility: HOSPITAL | Age: 61
End: 2021-12-13

## 2021-12-14 ENCOUNTER — APPOINTMENT (OUTPATIENT)
Dept: CT IMAGING | Facility: HOSPITAL | Age: 61
End: 2021-12-14

## 2021-12-16 ENCOUNTER — HOSPITAL ENCOUNTER (OUTPATIENT)
Dept: CT IMAGING | Facility: HOSPITAL | Age: 61
Discharge: HOME | End: 2021-12-16
Admitting: INTERNAL MEDICINE

## 2021-12-16 DIAGNOSIS — F17.210 CIGARETTE NICOTINE DEPENDENCE WITHOUT COMPLICATION: ICD-10-CM

## 2021-12-16 DIAGNOSIS — Z12.2 SCREENING FOR LUNG CANCER: ICD-10-CM

## 2021-12-16 PROCEDURE — 71271 CT THORAX LUNG CANCER SCR C-: CPT

## 2022-03-03 DIAGNOSIS — J41.1 MUCOPURULENT CHRONIC BRONCHITIS: Chronic | ICD-10-CM

## 2022-04-05 ENCOUNTER — TELEPHONE (OUTPATIENT)
Dept: INTERNAL MEDICINE | Facility: CLINIC | Age: 62
End: 2022-04-05

## 2022-04-05 NOTE — TELEPHONE ENCOUNTER
PATIENT IS REQUESTING FOR HIS LAB ORDERS TO BE PALCED SO HE CAN COME IN EARLY TO HAVE THEM DRAWN.

## 2022-04-06 NOTE — TELEPHONE ENCOUNTER
Orders have been placed.  Patient may go to the Diagnostic lab any day Monday - Friday from 7:30 am - 4:00 pm.  He does need to go fasting (water/black coffee is okay) but he does not need to schedule an appointment.        *HUB okay to relay

## 2022-04-13 ENCOUNTER — LAB (OUTPATIENT)
Dept: LAB | Facility: HOSPITAL | Age: 62
End: 2022-04-13

## 2022-04-13 DIAGNOSIS — D75.1 POLYCYTHEMIA SECONDARY TO SMOKING: Chronic | ICD-10-CM

## 2022-04-13 DIAGNOSIS — N40.1 BENIGN PROSTATIC HYPERPLASIA WITH WEAK URINARY STREAM: ICD-10-CM

## 2022-04-13 DIAGNOSIS — R73.01 IMPAIRED FASTING GLUCOSE: Chronic | ICD-10-CM

## 2022-04-13 DIAGNOSIS — R39.12 BENIGN PROSTATIC HYPERPLASIA WITH WEAK URINARY STREAM: ICD-10-CM

## 2022-04-13 DIAGNOSIS — E78.2 MIXED HYPERLIPIDEMIA: Chronic | ICD-10-CM

## 2022-04-13 DIAGNOSIS — Z00.00 ROUTINE PHYSICAL EXAMINATION: ICD-10-CM

## 2022-04-13 LAB
ALBUMIN SERPL-MCNC: 4.6 G/DL (ref 3.5–5.2)
ALBUMIN/GLOB SERPL: 2.2 G/DL
ALP SERPL-CCNC: 93 U/L (ref 39–117)
ALT SERPL W P-5'-P-CCNC: 27 U/L (ref 1–41)
ANION GAP SERPL CALCULATED.3IONS-SCNC: 9 MMOL/L (ref 5–15)
AST SERPL-CCNC: 24 U/L (ref 1–40)
BACTERIA UR QL AUTO: ABNORMAL /HPF
BILIRUB SERPL-MCNC: 0.3 MG/DL (ref 0–1.2)
BILIRUB UR QL STRIP: NEGATIVE
BUN SERPL-MCNC: 13 MG/DL (ref 6–20)
BUN/CREAT SERPL: 14.6 (ref 7–25)
CALCIUM SPEC-SCNC: 9.1 MG/DL (ref 8.6–10.5)
CHLORIDE SERPL-SCNC: 105 MMOL/L (ref 98–107)
CLARITY UR: CLEAR
CO2 SERPL-SCNC: 26 MMOL/L (ref 22–29)
COLOR UR: YELLOW
CREAT SERPL-MCNC: 0.89 MG/DL (ref 0.76–1.27)
DEPRECATED RDW RBC AUTO: 42.5 FL (ref 37–54)
EGFRCR SERPLBLD CKD-EPI 2021: 98.7 ML/MIN/1.73
ERYTHROCYTE [DISTWIDTH] IN BLOOD BY AUTOMATED COUNT: 12 % (ref 12.3–15.4)
GLOBULIN UR ELPH-MCNC: 2.1 GM/DL
GLUCOSE SERPL-MCNC: 103 MG/DL (ref 65–99)
GLUCOSE UR STRIP-MCNC: NEGATIVE MG/DL
HBA1C MFR BLD: 5.3 % (ref 4.8–5.6)
HCT VFR BLD AUTO: 47.4 % (ref 37.5–51)
HGB BLD-MCNC: 16.1 G/DL (ref 13–17.7)
HGB UR QL STRIP.AUTO: NEGATIVE
HYALINE CASTS UR QL AUTO: ABNORMAL /LPF
KETONES UR QL STRIP: NEGATIVE
LEUKOCYTE ESTERASE UR QL STRIP.AUTO: ABNORMAL
MCH RBC QN AUTO: 32.8 PG (ref 26.6–33)
MCHC RBC AUTO-ENTMCNC: 34 G/DL (ref 31.5–35.7)
MCV RBC AUTO: 96.5 FL (ref 79–97)
NITRITE UR QL STRIP: NEGATIVE
PH UR STRIP.AUTO: 6.5 [PH] (ref 5–8)
PLATELET # BLD AUTO: 220 10*3/MM3 (ref 140–450)
PMV BLD AUTO: 11 FL (ref 6–12)
POTASSIUM SERPL-SCNC: 4.5 MMOL/L (ref 3.5–5.2)
PROT SERPL-MCNC: 6.7 G/DL (ref 6–8.5)
PROT UR QL STRIP: ABNORMAL
PSA SERPL-MCNC: 1.95 NG/ML (ref 0–4)
RBC # BLD AUTO: 4.91 10*6/MM3 (ref 4.14–5.8)
RBC # UR STRIP: ABNORMAL /HPF
REF LAB TEST METHOD: ABNORMAL
SODIUM SERPL-SCNC: 140 MMOL/L (ref 136–145)
SP GR UR STRIP: 1.02 (ref 1–1.03)
SQUAMOUS #/AREA URNS HPF: ABNORMAL /HPF
T3FREE SERPL-MCNC: 3.45 PG/ML (ref 2–4.4)
T4 FREE SERPL-MCNC: 1.05 NG/DL (ref 0.93–1.7)
TSH SERPL DL<=0.05 MIU/L-ACNC: 0.99 UIU/ML (ref 0.27–4.2)
UROBILINOGEN UR QL STRIP: ABNORMAL
WBC # UR STRIP: ABNORMAL /HPF
WBC NRBC COR # BLD: 5.06 10*3/MM3 (ref 3.4–10.8)

## 2022-04-13 PROCEDURE — 83704 LIPOPROTEIN BLD QUAN PART: CPT

## 2022-04-13 PROCEDURE — 83036 HEMOGLOBIN GLYCOSYLATED A1C: CPT

## 2022-04-13 PROCEDURE — 84153 ASSAY OF PSA TOTAL: CPT

## 2022-04-13 PROCEDURE — 36415 COLL VENOUS BLD VENIPUNCTURE: CPT

## 2022-04-13 PROCEDURE — 80050 GENERAL HEALTH PANEL: CPT

## 2022-04-13 PROCEDURE — 84481 FREE ASSAY (FT-3): CPT

## 2022-04-13 PROCEDURE — 80061 LIPID PANEL: CPT

## 2022-04-13 PROCEDURE — 84439 ASSAY OF FREE THYROXINE: CPT

## 2022-04-13 PROCEDURE — 81001 URINALYSIS AUTO W/SCOPE: CPT

## 2022-04-15 LAB
CHOLEST SERPL-MCNC: 197 MG/DL (ref 100–199)
HDL SERPL-SCNC: 39.9 UMOL/L
HDLC SERPL-MCNC: 57 MG/DL
LDL SERPL QN: 21.6 NM
LDL SERPL-SCNC: 1179 NMOL/L
LDL SMALL SERPL-SCNC: 405 NMOL/L
LDLC SERPL CALC-MCNC: 115 MG/DL (ref 0–99)
TRIGL SERPL-MCNC: 140 MG/DL (ref 0–149)

## 2022-04-18 ENCOUNTER — OFFICE VISIT (OUTPATIENT)
Dept: INTERNAL MEDICINE | Facility: CLINIC | Age: 62
End: 2022-04-18

## 2022-04-18 VITALS
RESPIRATION RATE: 18 BRPM | HEIGHT: 71 IN | HEART RATE: 80 BPM | DIASTOLIC BLOOD PRESSURE: 70 MMHG | BODY MASS INDEX: 22.71 KG/M2 | SYSTOLIC BLOOD PRESSURE: 120 MMHG | OXYGEN SATURATION: 97 % | WEIGHT: 162.2 LBS

## 2022-04-18 DIAGNOSIS — Z86.010 HISTORY OF COLON POLYPS: Chronic | ICD-10-CM

## 2022-04-18 DIAGNOSIS — E55.9 VITAMIN D DEFICIENCY: Chronic | ICD-10-CM

## 2022-04-18 DIAGNOSIS — R73.01 IMPAIRED FASTING GLUCOSE: Chronic | ICD-10-CM

## 2022-04-18 DIAGNOSIS — D75.1 POLYCYTHEMIA SECONDARY TO SMOKING: Chronic | ICD-10-CM

## 2022-04-18 DIAGNOSIS — N40.1 BENIGN PROSTATIC HYPERPLASIA WITH WEAK URINARY STREAM: Chronic | ICD-10-CM

## 2022-04-18 DIAGNOSIS — Z23 NEED FOR PNEUMOCOCCAL VACCINATION: ICD-10-CM

## 2022-04-18 DIAGNOSIS — E78.2 MIXED HYPERLIPIDEMIA: Chronic | ICD-10-CM

## 2022-04-18 DIAGNOSIS — Z51.81 THERAPEUTIC DRUG MONITORING: ICD-10-CM

## 2022-04-18 DIAGNOSIS — R39.12 BENIGN PROSTATIC HYPERPLASIA WITH WEAK URINARY STREAM: Chronic | ICD-10-CM

## 2022-04-18 DIAGNOSIS — F17.219 CIGARETTE NICOTINE DEPENDENCE WITH NICOTINE-INDUCED DISORDER: ICD-10-CM

## 2022-04-18 DIAGNOSIS — R53.82 CHRONIC FATIGUE: ICD-10-CM

## 2022-04-18 DIAGNOSIS — M15.9 GENERALIZED OSTEOARTHRITIS OF MULTIPLE SITES: Chronic | ICD-10-CM

## 2022-04-18 DIAGNOSIS — J41.1 MUCOPURULENT CHRONIC BRONCHITIS: Chronic | ICD-10-CM

## 2022-04-18 DIAGNOSIS — Z00.00 ROUTINE PHYSICAL EXAMINATION: Primary | ICD-10-CM

## 2022-04-18 PROCEDURE — 99396 PREV VISIT EST AGE 40-64: CPT | Performed by: INTERNAL MEDICINE

## 2022-04-18 PROCEDURE — 90471 IMMUNIZATION ADMIN: CPT | Performed by: INTERNAL MEDICINE

## 2022-04-18 PROCEDURE — 90732 PPSV23 VACC 2 YRS+ SUBQ/IM: CPT | Performed by: INTERNAL MEDICINE

## 2022-04-18 NOTE — PROGRESS NOTES
04/18/2022    Patient Information  Hammad Justice                                                                                          [address] [facility] [facility] [address]      1960  [unfilled]  There is no work phone number on file.    Chief Complaint:     Routine physical examination and follow-up blood work in order to monitor chronic medical issues listed in history of present illness.  No new acute complaints.    History of Present Illness:    Patient with impaired fasting glucose, hyperlipidemia, COPD, vitamin D deficiency, cigarette smoking and secondary polycythemia, symptomatic BPH, generalized osteoarthritis, history of colon polyps, history of chronic fatigue.  He presents today for his routine annual physical exam and follow-up blood work.  His past medical history reviewed and updated were necessary including health maintenance parameters.  This reveals he needs a PPSV23.  Also needs a COVID booster which I encouraged him to get.     Review of Systems   Constitutional: Negative.   HENT: Negative.    Eyes: Negative.    Cardiovascular: Negative.    Respiratory: Positive for cough, shortness of breath and sputum production.    Endocrine: Negative.    Hematologic/Lymphatic: Negative.    Skin: Negative.    Musculoskeletal: Negative.    Gastrointestinal: Negative.    Genitourinary: Negative.    Neurological: Negative.    Psychiatric/Behavioral: Negative.    Allergic/Immunologic: Negative.        Active Problems:    Patient Active Problem List   Diagnosis   • Chronic obstructive pulmonary disease (COPD)   • History of colon polyps, 7/28/2020--tubular adenoma x 2.  Hyperplastic x 3.  09/12/2013--hyperplastic ×1.   • Generalized osteoarthritis of multiple sites   • Hyperlipidemia   • Cigarette nicotine dependence with nicotine-induced disorder   • Vitamin D deficiency   • Therapeutic drug monitoring   • Routine physical examination   • Encounter for smoking cessation counseling   •  Polycythemia secondary to smoking   • Benign prostatic hyperplasia with weak urinary stream   • Impaired fasting glucose   • Chronic fatigue         Past Medical History:   Diagnosis Date   • Calcium oxalate crystals present in urine 03/20/2019 March 20, 2019--patient presents with a progressively worsening decreased urine flow over the past year or so.  He reports he just takes a long time for him to initiate and complete urination.  He has no pain or dysuria.  No blood noted in his urine.  Urinalysis today reveals 31-50 WBCs but there are 3-6 epithelial cells present as well as 1+ calcium oxalate crystals.  No bacteria seen.  No red bl   • Chronic obstructive pulmonary disease (COPD) 08/12/2013 08/12/2013--pulmonary function tests revealed FVC 3.35, 66% of predicted. Post bronchodilator FVC 3.31. FEV1 1.77, 45% of predicted. 1.8 to post bronchodilator. FEV1/FVC 53, Total lung capacity is 105% of predicted. Normal diffusion. Moderately severe obstructive pattern with no reversibility.   08/12/2013--pulmonary consult. Pulmonary function tests revealed moderate to severe obstructive pattern   • Cigarette nicotine dependence with nicotine-induced disorder 06/14/2016   • COPD (chronic obstructive pulmonary disease) (HCC) 08/12/2013 08/12/2013--pulmonary function tests revealed FVC 3.35, 66% of predicted. Post bronchodilator FVC 3.31. FEV1 1.77, 45% of predicted. 1.8 to post bronchodilator. FEV1/FVC 53, Total lung capacity is 105% of predicted. Normal diffusion. Moderately severe obstructive pattern with no reversibility.   08/12/2013--pulmonary consult. Pulmonary function tests revealed moderate to severe obstructive pattern. Diagnosis COPD. Chronic bronchitis type. Patient was started on Ventolin when necessary and Spiriva HandiHaler use once daily.   • Generalized osteoarthritis of multiple sites 06/14/2016   • History of Benign essential hypertension 04/04/2012 04/04/2012--blood pressure remains low and  patient had to discontinue lisinopril because of dizziness.   03/14/2012--blood pressure too low at 102/64. Lisinopril HCT decreased to 10/12.5.   02/22/2012--blood pressure elevated at 140/90. Lisinopril HCT 20/12.5 daily initiated.   • History of colon polyps, 7/28/2020--tubular adenoma x 2.  Hyperplastic x 3.  09/12/2013--hyperplastic ×1. 09/12/2013 July 28, 2020--colonoscopy revealed a 1 mm polyp in the rectum.  #4, 1 to 3 mm polyps in the rectosigmoid colon.  #1, 1 mm polyp in the rectum.  Polyps were removed.  Pathology returned tubular adenoma in the proximal ascending and sigmoid colon.  Hyperplastic x3.  09/12/2013--colonoscopy revealed a 3-4 mm polyp in the sigmoid colon which was biopsied and removed. Otherwise, normal colonoscopy to    • History of pulmonary function tests 08/12/2013 08/12/2013--pulmonary function tests revealed FVC 3.35, 66% of predicted. Post bronchodilator FVC 3.31. FEV1 1.77, 45% of predicted. 1.8 to post bronchodilator. FEV1/FVC 53, Total lung capacity is 105% of predicted. Normal diffusion. Moderately severe obstructive pattern with no reversibility.   • Hyperlipidemia 06/14/2016   • Impaired fasting glucose 03/20/2019   • Nicotine dependence 06/14/2016   • Polycythemia secondary to smoking 07/07/2016 07/07/2016--routine physical exam.  CBC reveals hemoglobin elevated at 17.9 and hematocrit elevated at 53.5.  Patient has a somewhat heavy smoker and I feel this is the etiology of this and will not pursue hematologic workup, at least at the present time.  Patient is in the process of trying to discontinue cigarettes.  Aspirin 81 mg per day initiated.   • Vitamin D deficiency 06/14/2016         Past Surgical History:   Procedure Laterality Date   • COLONOSCOPY  09/12/2013 09/12/2013--colonoscopy revealed a 3-4 mm polyp in the sigmoid colon which was biopsied and removed. Otherwise, normal colonoscopy to cecum with an excellent prep. Pathology returned hyperplastic.   •  "COLONOSCOPY N/A 2020--colonoscopy revealed a 1 mm polyp in the rectum.  #4, 1 to 3 mm polyps in the rectosigmoid colon.  #1, 1 mm polyp in the rectum.  Polyps were removed.  Pathology returned tubular adenoma in the proximal ascending and sigmoid colon.  Hyperplastic x3.   • KNEE ARTHROSCOPY Right 33 years of age    Age 33--right meniscus surgery.         No Known Allergies        Current Outpatient Medications:   •  albuterol sulfate HFA (Ventolin HFA) 108 (90 Base) MCG/ACT inhaler, Inhale 2 puffs Every 4 (Four) Hours As Needed for Wheezing., Disp: 6.7 g, Rfl: 5  •  aspirin 81 MG tablet, Take 1 tablet by mouth daily., Disp: 90 tablet, Rfl: 3  •  umeclidinium-vilanterol (Anoro Ellipta) 62.5-25 MCG/INH aerosol powder  inhaler, INHALE ONE DOSE BY MOUTH DAILY AS DIRECTED FOR COPD, Disp: 60 each, Rfl: 3    Current Facility-Administered Medications:   •  pneumococcal polysaccharide 23-valent (PNEUMOVAX-23) vaccine 0.5 mL, 0.5 mL, Subcutaneous, Once, Niko Villasenor MD      Family History   Problem Relation Age of Onset   • Anuerysm Brother         Cerebral Artery Aneurysm. Brother  of a brain aneurysm at age 52.   • Hypertension Other         Benign Essential   • Heart disease Other    • Malig Hyperthermia Neg Hx          Social History     Socioeconomic History   • Marital status:    • Number of children: 2   • Highest education level: 8th grade   Tobacco Use   • Smoking status: Current Every Day Smoker     Packs/day: 2.00   • Smokeless tobacco: Never Used   Vaping Use   • Vaping Use: Never used   Substance and Sexual Activity   • Alcohol use: Yes     Comment: 6 BEERS A DAY    • Drug use: No   • Sexual activity: Yes     Partners: Female         Vitals:    22 0732   BP: 120/70   Pulse: 80   Resp: 18   SpO2: 97%   Weight: 73.6 kg (162 lb 3.2 oz)   Height: 180.3 cm (71\")        Body mass index is 22.62 kg/m².      Physical Exam:    General: Alert and oriented x 3.  No acute " distress.  Normal affect.  HEENT: Pupils equal, round, reactive to light; extraocular movements intact; sclerae nonicteric; pharynx, ear canals and TMs normal.  Neck: Without JVD, thyromegaly, bruit, or adenopathy.  Lungs: Clear to auscultation in all fields, except for a few scattered rhonchi diffusely.  Heart: Regular rate and rhythm without murmur, rub, gallop, or click.  Abdomen: Soft, nontender, without hepatosplenomegaly or hernia.  Bowel sounds normal.  : Deferred.  Rectal: Deferred.  Extremities: Without clubbing, cyanosis, edema, or pulse deficit.  Neurologic: Intact without focal deficit.  Normal station and gait observed during ingress and egress from the examination room.  Skin: Without significant lesion.  Musculoskeletal: Unremarkable.    Lab/other results:    CBC is normal.  CMP normal except glucose 103.  Hemoglobin A1c 5.3.  NMR reveals a total cholesterol 197, triglyceride 140, LDL particle number mildly elevated 1179.  Small LDL particle number excellent 405.  HDL particle number good at 39.9.  Urinalysis reveals 3-5 WBCs but there also 3-6 squamous epithelial cells.  Thyroid function tests are normal.  PSA normal at 1.95    Assessment/Plan:     Diagnosis Plan   1. Routine physical examination  CBC (No Diff)    Comprehensive Metabolic Panel    Hemoglobin A1c    NMR LipoProfile    Vitamin D 25 Hydroxy    Urinalysis With Microscopic If Indicated (No Culture) - Urine, Clean Catch    TSH    T4, Free    T3, Free    PSA DIAGNOSTIC   2. Impaired fasting glucose  Comprehensive Metabolic Panel    Hemoglobin A1c   3. Hyperlipidemia  NMR LipoProfile    TSH    T4, Free    T3, Free   4. Chronic obstructive pulmonary disease (COPD)     5. Vitamin D deficiency  Vitamin D 25 Hydroxy   6. Cigarette nicotine dependence with nicotine-induced disorder     7. Polycythemia secondary to smoking  CBC (No Diff)   8. Benign prostatic hyperplasia with weak urinary stream  Urinalysis With Microscopic If Indicated (No  Culture) - Urine, Clean Catch    PSA DIAGNOSTIC   9. Generalized osteoarthritis of multiple sites     10. History of colon polyps, 7/28/2020--tubular adenoma x 2.  Hyperplastic x 3.  09/12/2013--hyperplastic ×1.     11. Chronic fatigue     12. Therapeutic drug monitoring  Urinalysis With Microscopic If Indicated (No Culture) - Urine, Clean Catch   13. Need for pneumococcal vaccination  pneumococcal polysaccharide 23-valent (PNEUMOVAX-23) vaccine 0.5 mL     Patient presents with essentially normal annual physical except for the following issues: He has very mild impaired fasting glucose that does not require medication.  Strongly recommend low carbohydrate diet, exercise, and attainment of ideal body weight.  Hyperlipidemia is controlled with diet.  He has COPD and is a cigarette smoker.  He has had a history of polycythemia secondary to smoking and this apparently has resolved but we need to monitor.  Patient is not quite committed to smoking cessation at this time.  BPH symptoms are tolerable.  Required no medication.  He has generalized osteoarthritis and aches and pains related to this.  He has a history of colon polyps and is up-to-date on his colonoscopy.  He has history of chronic fatigue and his lab work was nonrevealing including testosterone levels, although his free and weakly bound testosterone was low normal.     Several preventative health issues discussed including review of vaccinations and recommendations, including dietary issues, exercise and weight loss.  Safe sex practices discussed.  Patient advised to wear seatbelt whenever driving and avoid texting and driving.  Also advised to look both ways before crossing the street.  Patient is up-to-date on his colon cancer screening.  Advised to avoid tobacco products and minimize alcohol consumption.    Plan is as follows: Strongly recommend low carbohydrate diet, exercise, and attainment/maintenance of ideal body weight.  Patient instructed to contact  me if he is ready for smoking cessation discussion.  PPSV23 given.  I encouraged patient to get COVID-19 booster.  Patient will follow-up in 1 year with lab prior for his annual physical or follow-up as needed.    Procedures

## 2022-06-06 DIAGNOSIS — J41.1 MUCOPURULENT CHRONIC BRONCHITIS: Chronic | ICD-10-CM

## 2022-06-07 RX ORDER — ALBUTEROL SULFATE 90 UG/1
AEROSOL, METERED RESPIRATORY (INHALATION)
Qty: 18 G | Refills: 3 | Status: SHIPPED | OUTPATIENT
Start: 2022-06-07

## 2022-06-24 DIAGNOSIS — J41.1 MUCOPURULENT CHRONIC BRONCHITIS: Chronic | ICD-10-CM

## 2022-10-26 DIAGNOSIS — J41.1 MUCOPURULENT CHRONIC BRONCHITIS: Chronic | ICD-10-CM

## 2022-10-27 RX ORDER — UMECLIDINIUM BROMIDE AND VILANTEROL TRIFENATATE 62.5; 25 UG/1; UG/1
POWDER RESPIRATORY (INHALATION)
Qty: 180 EACH | Refills: 1 | Status: SHIPPED | OUTPATIENT
Start: 2022-10-27 | End: 2022-12-27 | Stop reason: SDUPTHER

## 2022-12-21 ENCOUNTER — TELEPHONE (OUTPATIENT)
Dept: INTERNAL MEDICINE | Facility: CLINIC | Age: 62
End: 2022-12-21

## 2022-12-21 NOTE — TELEPHONE ENCOUNTER
Caller: Hammad Justice    Relationship to patient: Self    Best call back number: [phone]    Patient is needing: PATIENT WAS EXPERIENCING FEVER,COUGHING SUPPLE ,AND LIGHTHEADEDNESS. HUB UNABLE WARM TRANSFER AS SOONEST APPOINTMENT WAS NOT UNTIL 12/27/22 WITH RICK LINARES. PATIENT IS REQUESTING A CALLBACK TO KNOW IF ANYTHING CAN BE SCHEDULED SOONER.

## 2022-12-27 ENCOUNTER — OFFICE VISIT (OUTPATIENT)
Dept: INTERNAL MEDICINE | Facility: CLINIC | Age: 62
End: 2022-12-27

## 2022-12-27 VITALS
OXYGEN SATURATION: 94 % | DIASTOLIC BLOOD PRESSURE: 64 MMHG | BODY MASS INDEX: 22.4 KG/M2 | WEIGHT: 160 LBS | HEART RATE: 84 BPM | HEIGHT: 71 IN | SYSTOLIC BLOOD PRESSURE: 122 MMHG | RESPIRATION RATE: 18 BRPM

## 2022-12-27 DIAGNOSIS — J44.1 COPD WITH EXACERBATION: Primary | ICD-10-CM

## 2022-12-27 DIAGNOSIS — F17.219 CIGARETTE NICOTINE DEPENDENCE WITH NICOTINE-INDUCED DISORDER: Chronic | ICD-10-CM

## 2022-12-27 DIAGNOSIS — J44.1 COPD WITH EXACERBATION: ICD-10-CM

## 2022-12-27 DIAGNOSIS — J41.1 MUCOPURULENT CHRONIC BRONCHITIS: Chronic | ICD-10-CM

## 2022-12-27 PROCEDURE — 99214 OFFICE O/P EST MOD 30 MIN: CPT | Performed by: INTERNAL MEDICINE

## 2022-12-27 RX ORDER — BROMPHENIRAMINE MALEATE, PSEUDOEPHEDRINE HYDROCHLORIDE, AND DEXTROMETHORPHAN HYDROBROMIDE 2; 30; 10 MG/5ML; MG/5ML; MG/5ML
SYRUP ORAL
COMMUNITY
Start: 2022-12-22 | End: 2022-12-27

## 2022-12-27 RX ORDER — PREDNISONE 10 MG/1
TABLET ORAL
Qty: 56 TABLET | Refills: 0 | Status: SHIPPED | OUTPATIENT
Start: 2022-12-27

## 2022-12-27 RX ORDER — CEFDINIR 300 MG/1
CAPSULE ORAL
Qty: 20 CAPSULE | Refills: 0 | Status: SHIPPED | OUTPATIENT
Start: 2022-12-27

## 2022-12-27 RX ORDER — PREDNISONE 20 MG/1
TABLET ORAL
COMMUNITY
Start: 2022-12-22 | End: 2022-12-27

## 2022-12-27 RX ORDER — UMECLIDINIUM BROMIDE AND VILANTEROL TRIFENATATE 62.5; 25 UG/1; UG/1
POWDER RESPIRATORY (INHALATION)
Qty: 1 EACH | Refills: 11
Start: 2022-12-27

## 2022-12-27 NOTE — TELEPHONE ENCOUNTER
Caller: Hammad Justice    Relationship: Self    Best call back number: [phone]     Requested Prescriptions:   Requested Prescriptions     Pending Prescriptions Disp Refills   • HYDROcod Polst-CPM Polst ER (Tussionex Pennkinetic ER) 10-8 MG/5ML ER suspension 90 mL 0     Sig: Take 1 teaspoon p.o. every 12 hours as needed for cough and congestion        Pharmacy where request should be sent: [facility] [id number] [place], [place] - [address] - [phone]  - [phone] FX     Additional details provided by patient: ORIGINAL PHARMACY DOES NOT HAVE THIS MEDICATION IN STOCK    Does the patient have less than a 3 day supply:  [x] Yes  [] No    Would you like a call back once the refill request has been completed: [] Yes [x] No    If the office needs to give you a call back, can they leave a voicemail: [] Yes [x] No    Elda Pollock Rep   12/27/22 14:12 EST

## 2022-12-28 ENCOUNTER — TELEPHONE (OUTPATIENT)
Dept: INTERNAL MEDICINE | Facility: CLINIC | Age: 62
End: 2022-12-28

## 2022-12-28 DIAGNOSIS — J41.1 MUCOPURULENT CHRONIC BRONCHITIS: Chronic | ICD-10-CM

## 2022-12-28 DIAGNOSIS — J44.1 COPD WITH EXACERBATION: ICD-10-CM

## 2022-12-28 NOTE — TELEPHONE ENCOUNTER
Called pharmacy in [place] ([facility]) to get Rx transferred. Rx must be resent. Will make pt aware.

## 2022-12-28 NOTE — TELEPHONE ENCOUNTER
Caller: Hammad Justice    Relationship: Self    Best call back number:[phone]    What medication are you requesting:  HYDROcod Polst-CPM Polst ER (Tussionex Pennkinetic ER) 10-8 MG/5ML ER suspension      What are your current symptoms: EXTREME COUGHING    How long have you been experiencing symptoms: 1 WEEK    Have you had these symptoms before:    [x] Yes  [] No    Have you been treated for these symptoms before:   [x] Yes  [] No    If a prescription is needed, what is your preferred pharmacy and phone number: [facility] [id number] - [place], [place] - [address] - [phone]  - [phone] FX     Additional notes:PLEASE RESEND THIS PRESCRIPTION TO THE CORRECT PHARMACY LISTED ABOVE.

## 2023-04-12 DIAGNOSIS — Z00.00 ROUTINE PHYSICAL EXAMINATION: ICD-10-CM

## 2023-04-12 DIAGNOSIS — E78.2 MIXED HYPERLIPIDEMIA: Chronic | ICD-10-CM

## 2023-04-12 DIAGNOSIS — D75.1 POLYCYTHEMIA SECONDARY TO SMOKING: Chronic | ICD-10-CM

## 2023-04-12 DIAGNOSIS — Z51.81 THERAPEUTIC DRUG MONITORING: ICD-10-CM

## 2023-04-12 DIAGNOSIS — N40.1 BENIGN PROSTATIC HYPERPLASIA WITH WEAK URINARY STREAM: Chronic | ICD-10-CM

## 2023-04-12 DIAGNOSIS — R73.01 IMPAIRED FASTING GLUCOSE: Chronic | ICD-10-CM

## 2023-04-12 DIAGNOSIS — R39.12 BENIGN PROSTATIC HYPERPLASIA WITH WEAK URINARY STREAM: Chronic | ICD-10-CM

## 2023-04-12 DIAGNOSIS — E55.9 VITAMIN D DEFICIENCY: Chronic | ICD-10-CM

## 2023-04-14 LAB
25(OH)D3+25(OH)D2 SERPL-MCNC: 37 NG/ML (ref 30–100)
ALBUMIN SERPL-MCNC: 4.3 G/DL (ref 3.5–5.2)
ALBUMIN/GLOB SERPL: 1.7 G/DL
ALP SERPL-CCNC: 98 U/L (ref 39–117)
ALT SERPL-CCNC: 41 U/L (ref 1–41)
APPEARANCE UR: CLEAR
AST SERPL-CCNC: 33 U/L (ref 1–40)
BILIRUB SERPL-MCNC: 0.5 MG/DL (ref 0–1.2)
BILIRUB UR QL STRIP: NEGATIVE
BUN SERPL-MCNC: 15 MG/DL (ref 8–23)
BUN/CREAT SERPL: 16 (ref 7–25)
CALCIUM SERPL-MCNC: 9 MG/DL (ref 8.6–10.5)
CHLORIDE SERPL-SCNC: 104 MMOL/L (ref 98–107)
CHOLEST SERPL-MCNC: 200 MG/DL (ref 100–199)
CO2 SERPL-SCNC: 28 MMOL/L (ref 22–29)
COLOR UR: YELLOW
CREAT SERPL-MCNC: 0.94 MG/DL (ref 0.76–1.27)
EGFRCR SERPLBLD CKD-EPI 2021: 92.8 ML/MIN/1.73
ERYTHROCYTE [DISTWIDTH] IN BLOOD BY AUTOMATED COUNT: 11.3 % (ref 12.3–15.4)
GLOBULIN SER CALC-MCNC: 2.5 GM/DL
GLUCOSE SERPL-MCNC: 96 MG/DL (ref 65–99)
GLUCOSE UR QL STRIP: NEGATIVE
HBA1C MFR BLD: 5.7 % (ref 4.8–5.6)
HCT VFR BLD AUTO: 44.6 % (ref 37.5–51)
HDL SERPL-SCNC: 40.2 UMOL/L
HDLC SERPL-MCNC: 52 MG/DL
HGB BLD-MCNC: 15.7 G/DL (ref 13–17.7)
HGB UR QL STRIP: NEGATIVE
KETONES UR QL STRIP: NEGATIVE
LDL SERPL QN: 19.7 NM
LDL SERPL-SCNC: 1464 NMOL/L
LDL SMALL SERPL-SCNC: 1003 NMOL/L
LDLC SERPL CALC-MCNC: 85 MG/DL (ref 0–99)
LEUKOCYTE ESTERASE UR QL STRIP: NEGATIVE
MCH RBC QN AUTO: 32.6 PG (ref 26.6–33)
MCHC RBC AUTO-ENTMCNC: 35.2 G/DL (ref 31.5–35.7)
MCV RBC AUTO: 92.7 FL (ref 79–97)
NITRITE UR QL STRIP: NEGATIVE
PH UR STRIP: 5.5 [PH] (ref 5–8)
PLATELET # BLD AUTO: 242 10*3/MM3 (ref 140–450)
POTASSIUM SERPL-SCNC: 4.4 MMOL/L (ref 3.5–5.2)
PROT SERPL-MCNC: 6.8 G/DL (ref 6–8.5)
PROT UR QL STRIP: NEGATIVE
PSA SERPL-MCNC: 1.42 NG/ML (ref 0–4)
RBC # BLD AUTO: 4.81 10*6/MM3 (ref 4.14–5.8)
SODIUM SERPL-SCNC: 144 MMOL/L (ref 136–145)
SP GR UR STRIP: 1.02 (ref 1–1.03)
T3FREE SERPL-MCNC: 4 PG/ML (ref 2–4.4)
T4 FREE SERPL-MCNC: 1.33 NG/DL (ref 0.93–1.7)
TRIGL SERPL-MCNC: 386 MG/DL (ref 0–149)
TSH SERPL DL<=0.005 MIU/L-ACNC: 1.19 UIU/ML (ref 0.27–4.2)
UROBILINOGEN UR STRIP-MCNC: NORMAL MG/DL
WBC # BLD AUTO: 3.98 10*3/MM3 (ref 3.4–10.8)

## 2023-06-06 DIAGNOSIS — J41.1 MUCOPURULENT CHRONIC BRONCHITIS: Chronic | ICD-10-CM

## 2023-06-06 RX ORDER — UMECLIDINIUM BROMIDE AND VILANTEROL TRIFENATATE 62.5; 25 UG/1; UG/1
POWDER RESPIRATORY (INHALATION)
Qty: 1 EACH | Refills: 3
Start: 2023-06-06 | End: 2023-06-08 | Stop reason: SDUPTHER

## 2023-06-06 NOTE — TELEPHONE ENCOUNTER
DELETE AFTER REVIEWING: Send the encounter HIGH priority, If patient has less than a 3 day supply. If the patient will run out of medication over the weekend add that information to the additional details line. Send this encounter to the clinical pool.    Caller: Hammad Justice    Relationship: Self    Best call back number:   [phone]     Requested Prescriptions:   Requested Prescriptions     Pending Prescriptions Disp Refills    Umeclidinium-Vilanterol (Anoro Ellipta) 62.5-25 MCG/ACT aerosol powder  inhaler 1 each 11     Sig: Inhale 1 puff daily as directed for COPD        Pharmacy where request should be sent: [facility] [id number] [address] - [phone]  - [phone] FX     Last office visit with prescribing clinician: 12/27/2022   Last telemedicine visit with prescribing clinician: Visit date not found   Next office visit with prescribing clinician: 1/2/2024         Does the patient have less than a 3 day supply:  [x] Yes  [] No    Would you like a call back once the refill request has been completed: [x] Yes [] No    If the office needs to give you a call back, can they leave a voicemail: [x] Yes [] No    Elda Reilly Rep   06/06/23 10:38 EDT

## 2023-06-08 ENCOUNTER — TELEPHONE (OUTPATIENT)
Dept: INTERNAL MEDICINE | Facility: CLINIC | Age: 63
End: 2023-06-08
Payer: COMMERCIAL

## 2023-06-08 DIAGNOSIS — J41.1 MUCOPURULENT CHRONIC BRONCHITIS: Chronic | ICD-10-CM

## 2023-06-08 RX ORDER — UMECLIDINIUM BROMIDE AND VILANTEROL TRIFENATATE 62.5; 25 UG/1; UG/1
POWDER RESPIRATORY (INHALATION)
Qty: 1 EACH | Refills: 3 | Status: SHIPPED | OUTPATIENT
Start: 2023-06-08 | End: 2023-06-08 | Stop reason: SDUPTHER

## 2023-06-08 RX ORDER — UMECLIDINIUM BROMIDE AND VILANTEROL TRIFENATATE 62.5; 25 UG/1; UG/1
POWDER RESPIRATORY (INHALATION)
Qty: 1 EACH | Refills: 3 | Status: SHIPPED | OUTPATIENT
Start: 2023-06-08

## 2023-06-08 NOTE — TELEPHONE ENCOUNTER
Caller: Eliana Justice    Relationship: Emergency Contact    Best call back number: [phone] (Mobile)     Requested Prescriptions:   Requested Prescriptions     Pending Prescriptions Disp Refills    Umeclidinium-Vilanterol (Anoro Ellipta) 62.5-25 MCG/ACT aerosol powder  inhaler 1 each 3     Sig: Inhale 1 puff daily as directed for COPD        Pharmacy where request should be sent: [facility] [id number] [address] - [phone]  - [phone] FX     Last office visit with prescribing clinician: 12/27/2022   Last telemedicine visit with prescribing clinician: Visit date not found   Next office visit with prescribing clinician: 1/2/2024     Additional details provided by patient: PATIENT'S WIFE STATES THIS MEDICATION WAS SENT TO THE WRONG PHARMACY AND IT NEEDS TO BE RESENT TO THE CORRECT PHARMACY ASAP BECAUSE THEY ARE LEAVING TOWN TOMORROW, PLEASE ADVISE PATIENT'S WIFE WHEN SENT TO PHARMACY ASAP    Does the patient have less than a 3 day supply:  [x] Yes  [] No    Would you like a call back once the refill request has been completed: [x] Yes [] No    If the office needs to give you a call back, can they leave a voicemail: [x] Yes [] No    Elda Barrett   06/08/23 12:50 EDT

## 2023-10-05 DIAGNOSIS — J41.1 MUCOPURULENT CHRONIC BRONCHITIS: Chronic | ICD-10-CM

## 2023-10-05 RX ORDER — UMECLIDINIUM BROMIDE AND VILANTEROL TRIFENATATE 62.5; 25 UG/1; UG/1
POWDER RESPIRATORY (INHALATION)
Qty: 60 EACH | Refills: 3 | Status: SHIPPED | OUTPATIENT
Start: 2023-10-05

## 2023-10-31 ENCOUNTER — DOCUMENTATION (OUTPATIENT)
Dept: INTERNAL MEDICINE | Facility: CLINIC | Age: 63
End: 2023-10-31
Payer: COMMERCIAL

## 2023-10-31 ENCOUNTER — TELEPHONE (OUTPATIENT)
Dept: INTERNAL MEDICINE | Facility: CLINIC | Age: 63
End: 2023-10-31
Payer: COMMERCIAL

## 2023-10-31 NOTE — PROGRESS NOTES
Pt has been informed of the letter from Lung Cancer Screening program that doctor has received. Pt says that he has received a same letter and is schedule for an office visit with Dr. Villasenor to talk about next step.

## 2023-11-01 ENCOUNTER — OFFICE VISIT (OUTPATIENT)
Dept: INTERNAL MEDICINE | Facility: CLINIC | Age: 63
End: 2023-11-01
Payer: COMMERCIAL

## 2023-11-01 VITALS
RESPIRATION RATE: 12 BRPM | OXYGEN SATURATION: 95 % | WEIGHT: 165 LBS | BODY MASS INDEX: 23.62 KG/M2 | SYSTOLIC BLOOD PRESSURE: 112 MMHG | HEIGHT: 70 IN | HEART RATE: 95 BPM | DIASTOLIC BLOOD PRESSURE: 66 MMHG

## 2023-11-01 DIAGNOSIS — R91.1 PULMONARY NODULE: Primary | ICD-10-CM

## 2023-11-01 DIAGNOSIS — F17.210 HEAVY CIGARETTE SMOKER: ICD-10-CM

## 2023-11-01 DIAGNOSIS — I25.10 CORONARY ARTERY CALCIFICATION OF NATIVE ARTERY: ICD-10-CM

## 2023-11-01 DIAGNOSIS — I25.84 CORONARY ARTERY CALCIFICATION OF NATIVE ARTERY: ICD-10-CM

## 2023-11-01 DIAGNOSIS — D75.1 POLYCYTHEMIA SECONDARY TO SMOKING: Chronic | ICD-10-CM

## 2023-11-01 DIAGNOSIS — J41.1 MUCOPURULENT CHRONIC BRONCHITIS: Chronic | ICD-10-CM

## 2023-11-01 PROBLEM — J44.1 CHRONIC OBSTRUCTIVE PULMONARY DISEASE WITH ACUTE EXACERBATION: Status: RESOLVED | Noted: 2020-12-07 | Resolved: 2023-11-01

## 2023-11-01 PROBLEM — R53.82 CHRONIC FATIGUE: Status: RESOLVED | Noted: 2021-12-02 | Resolved: 2023-11-01

## 2023-11-01 PROBLEM — J41.0 SIMPLE CHRONIC BRONCHITIS: Status: ACTIVE | Noted: 2020-12-07

## 2023-11-01 PROCEDURE — 99214 OFFICE O/P EST MOD 30 MIN: CPT | Performed by: INTERNAL MEDICINE

## 2023-11-01 RX ORDER — ASPIRIN 81 MG/1
TABLET ORAL
Start: 2023-11-01

## 2023-11-01 NOTE — PROGRESS NOTES
11/01/2023    Patient Information  Hammad Justice                                                                                          [address] [facility] [facility] [address]      1960  [unfilled]  There is no work phone number on file.    Chief Complaint:     Follow-up concerns over abnormal CT scan of the chest and new coronary artery calcifications.    History of Present Illness:    Patient with a history of multiple medical problems including impaired fasting glucose, hyperlipidemia, polycythemia related to heavy cigarette smoking, COPD, pulmonary nodule of the left upper lobe, coronary artery calcification.  He presents today to discuss primarily the coronary calcifications and the abnormal lung cancer screening test obtained quite some time ago.  I have been out of the office and patient has been followed by one of my colleagues.    Review of Systems   Constitutional: Negative.   HENT: Negative.     Eyes: Negative.    Cardiovascular:  Positive for chest pain and dyspnea on exertion.   Respiratory:  Positive for cough.    Endocrine: Negative.    Hematologic/Lymphatic: Negative.    Skin: Negative.    Musculoskeletal:  Positive for muscle cramps.   Gastrointestinal: Negative.    Genitourinary: Negative.    Neurological: Negative.    Psychiatric/Behavioral: Negative.     Allergic/Immunologic: Negative.        Active Problems:    Patient Active Problem List   Diagnosis    Chronic obstructive pulmonary disease (COPD)    History of colon polyps, 7/28/2020--tubular adenoma x 2.  Hyperplastic x 3.  09/12/2013--hyperplastic ×1.    Generalized osteoarthritis of multiple sites    Hyperlipidemia    Cigarette nicotine dependence with nicotine-induced disorder    Vitamin D deficiency    Therapeutic drug monitoring    Routine physical examination    Polycythemia secondary to smoking    Benign prostatic hyperplasia with weak urinary stream    Impaired fasting glucose    Pulmonary nodule, 12/16/2021--small left  upper lobe micronodule.  Possible mucoid impaction right middle lobe.    Coronary artery calcification of native artery    Heavy cigarette smoker         Past Medical History:   Diagnosis Date    Calcium oxalate crystals present in urine 03/20/2019 March 20, 2019--patient presents with a progressively worsening decreased urine flow over the past year or so.  He reports he just takes a long time for him to initiate and complete urination.  He has no pain or dysuria.  No blood noted in his urine.  Urinalysis today reveals 31-50 WBCs but there are 3-6 epithelial cells present as well as 1+ calcium oxalate crystals.  No bacteria seen.  No red bl    Chronic obstructive pulmonary disease (COPD) 08/12/2013 08/12/2013--pulmonary function tests revealed FVC 3.35, 66% of predicted. Post bronchodilator FVC 3.31. FEV1 1.77, 45% of predicted. 1.8 to post bronchodilator. FEV1/FVC 53, Total lung capacity is 105% of predicted. Normal diffusion. Moderately severe obstructive pattern with no reversibility.   08/12/2013--pulmonary consult. Pulmonary function tests revealed moderate to severe obstructive pattern    Cigarette nicotine dependence with nicotine-induced disorder 06/14/2016    COPD (chronic obstructive pulmonary disease) 08/12/2013 08/12/2013--pulmonary function tests revealed FVC 3.35, 66% of predicted. Post bronchodilator FVC 3.31. FEV1 1.77, 45% of predicted. 1.8 to post bronchodilator. FEV1/FVC 53, Total lung capacity is 105% of predicted. Normal diffusion. Moderately severe obstructive pattern with no reversibility.   08/12/2013--pulmonary consult. Pulmonary function tests revealed moderate to severe obstructive pattern. Diagnosis COPD. Chronic bronchitis type. Patient was started on Ventolin when necessary and Spiriva HandiHaler use once daily.    Generalized osteoarthritis of multiple sites 06/14/2016    History of Benign essential hypertension 04/04/2012 04/04/2012--blood pressure remains low and patient  had to discontinue lisinopril because of dizziness.   03/14/2012--blood pressure too low at 102/64. Lisinopril HCT decreased to 10/12.5.   02/22/2012--blood pressure elevated at 140/90. Lisinopril HCT 20/12.5 daily initiated.    History of colon polyps, 7/28/2020--tubular adenoma x 2.  Hyperplastic x 3.  09/12/2013--hyperplastic ×1. 09/12/2013 July 28, 2020--colonoscopy revealed a 1 mm polyp in the rectum.  #4, 1 to 3 mm polyps in the rectosigmoid colon.  #1, 1 mm polyp in the rectum.  Polyps were removed.  Pathology returned tubular adenoma in the proximal ascending and sigmoid colon.  Hyperplastic x3.  09/12/2013--colonoscopy revealed a 3-4 mm polyp in the sigmoid colon which was biopsied and removed. Otherwise, normal colonoscopy to     History of pulmonary function tests 08/12/2013 08/12/2013--pulmonary function tests revealed FVC 3.35, 66% of predicted. Post bronchodilator FVC 3.31. FEV1 1.77, 45% of predicted. 1.8 to post bronchodilator. FEV1/FVC 53, Total lung capacity is 105% of predicted. Normal diffusion. Moderately severe obstructive pattern with no reversibility.    Hyperlipidemia 06/14/2016    Impaired fasting glucose 03/20/2019    Nicotine dependence 06/14/2016    Polycythemia secondary to smoking 07/07/2016 07/07/2016--routine physical exam.  CBC reveals hemoglobin elevated at 17.9 and hematocrit elevated at 53.5.  Patient has a somewhat heavy smoker and I feel this is the etiology of this and will not pursue hematologic workup, at least at the present time.  Patient is in the process of trying to discontinue cigarettes.  Aspirin 81 mg per day initiated.    Vitamin D deficiency 06/14/2016         Past Surgical History:   Procedure Laterality Date    COLONOSCOPY  09/12/2013 09/12/2013--colonoscopy revealed a 3-4 mm polyp in the sigmoid colon which was biopsied and removed. Otherwise, normal colonoscopy to cecum with an excellent prep. Pathology returned hyperplastic.    COLONOSCOPY N/A  "2020--colonoscopy revealed a 1 mm polyp in the rectum.  #4, 1 to 3 mm polyps in the rectosigmoid colon.  #1, 1 mm polyp in the rectum.  Polyps were removed.  Pathology returned tubular adenoma in the proximal ascending and sigmoid colon.  Hyperplastic x3.    KNEE ARTHROSCOPY Right 33 years of age    Age 33--right meniscus surgery.         No Known Allergies        Current Outpatient Medications:     albuterol sulfate  (90 Base) MCG/ACT inhaler, INHALE TWO PUFFS BY MOUTH EVERY 4 HOURS AS NEEDED FOR WHEEZING, Disp: 18 g, Rfl: 3    Umeclidinium-Vilanterol (Anoro Ellipta) 62.5-25 MCG/ACT aerosol powder  inhaler, INHALE 1 DOSE BY MOUTH DAILY AS DIRECTED FOR COPD, Disp: 60 each, Rfl: 3    aspirin 81 MG EC tablet, Take 1 p.o. 3 days a week on Monday, Wednesday, and Friday for heart protection, Disp: , Rfl:       Family History   Problem Relation Age of Onset    Anuerysm Brother         Cerebral Artery Aneurysm. Brother  of a brain aneurysm at age 52.    Hypertension Other         Benign Essential    Heart disease Other     Malig Hyperthermia Neg Hx          Social History     Socioeconomic History    Marital status:     Number of children: 2    Highest education level: 8th grade   Tobacco Use    Smoking status: Former     Packs/day: 2.00     Years: 5.00     Additional pack years: 0.00     Total pack years: 10.00     Types: Cigarettes     Quit date: 2023     Years since quittin.8    Smokeless tobacco: Never   Vaping Use    Vaping Use: Never used   Substance and Sexual Activity    Alcohol use: Yes     Comment: 6 BEERS A DAY     Drug use: No    Sexual activity: Yes     Partners: Female         Vitals:    23 1254   BP: 112/66   BP Location: Right arm   Patient Position: Sitting   Cuff Size: Adult   Pulse: 95   Resp: 12   SpO2: 95%   Weight: 74.8 kg (165 lb)   Height: 177.8 cm (70\")        Body mass index is 23.68 kg/m².      Physical Exam:    General: Alert and oriented x " 3.  No acute distress.  Normal affect.  HEENT: Pupils equal, round, reactive to light; extraocular movements intact; sclerae nonicteric; pharynx, ear canals and TMs normal.  Neck: Without JVD, thyromegaly, bruit, or adenopathy.  Lungs: Mostly clear.  There are a few scattered rhonchi right lobe posteriorly and inferiorly.  Slight end expiratory wheeze.  Heart: Regular rate and rhythm without murmur, rub, gallop, or click.  Abdomen: Soft, nontender, without hepatosplenomegaly or hernia.  Bowel sounds normal.  : Deferred.  Rectal: Deferred.  Extremities: Without clubbing, cyanosis, edema, or pulse deficit.  Neurologic: Intact without focal deficit.  Normal station and gait observed during ingress and egress from the examination room.  Skin: Without significant lesion.  Musculoskeletal: Unremarkable.    Lab/other results:    I reviewed the results of the lung cancer screening CT with the patient today.    Assessment/Plan:     Diagnosis Plan   1. Pulmonary nodule, 12/16/2021--small left upper lobe micronodule.  Possible mucoid impaction right middle lobe.  CT chest hi resolution      2. Coronary artery calcification of native artery  CT Cardiac Calcium Score Without Dye    aspirin 81 MG EC tablet      3. Heavy cigarette smoker  CT chest hi resolution      4. Polycythemia secondary to smoking        5. Chronic obstructive pulmonary disease (COPD)  CT chest hi resolution        Patient is a previous heavy cigarette smoker.  He was able to quit for about 6 or 7 months here recently but recently started back although is not smoking as heavy as he once was.  Strongly encouraged him to discontinue cigarettes altogether.  He has a pulmonary micronodule that needs to be monitored but there are also some other abnormalities on the CT scan that we need to assess with a high-resolution scan.  Possibly mucous plugging and there is also some scarring and hyperinflation consistent with COPD.  Patient also has coronary artery  calcification and I think he needs a coronary calcium score as well.  Polycythemia is related to the smoking and patient does have a appointment for a physical after the first of the year and hopefully that will be better.    Plan is as follows: High-resolution CT scan of the chest ordered.  Patient will get a coronary artery calcium CT at an outside facility due to cost/insurance constraints.  He has a follow-up after the first of the year and it may be another 6 weeks or so before patient can have the CT scan performed due to scheduling conflicts with his work.      Procedures

## 2023-11-07 ENCOUNTER — HOSPITAL ENCOUNTER (OUTPATIENT)
Dept: CT IMAGING | Facility: HOSPITAL | Age: 63
Discharge: HOME | End: 2023-11-07
Admitting: INTERNAL MEDICINE
Payer: COMMERCIAL

## 2023-11-07 DIAGNOSIS — F17.210 HEAVY CIGARETTE SMOKER: ICD-10-CM

## 2023-11-07 DIAGNOSIS — J41.1 MUCOPURULENT CHRONIC BRONCHITIS: Chronic | ICD-10-CM

## 2023-11-07 DIAGNOSIS — R91.1 PULMONARY NODULE: ICD-10-CM

## 2023-11-07 PROCEDURE — 71250 CT THORAX DX C-: CPT

## 2023-12-16 DIAGNOSIS — J41.1 MUCOPURULENT CHRONIC BRONCHITIS: Chronic | ICD-10-CM

## 2023-12-18 RX ORDER — UMECLIDINIUM BROMIDE AND VILANTEROL TRIFENATATE 62.5; 25 UG/1; UG/1
POWDER RESPIRATORY (INHALATION)
Qty: 180 EACH | Refills: 1 | Status: SHIPPED | OUTPATIENT
Start: 2023-12-18

## 2023-12-26 ENCOUNTER — OFFICE VISIT (OUTPATIENT)
Dept: INTERNAL MEDICINE | Facility: CLINIC | Age: 63
End: 2023-12-26
Payer: COMMERCIAL

## 2023-12-26 VITALS
SYSTOLIC BLOOD PRESSURE: 116 MMHG | WEIGHT: 165 LBS | HEART RATE: 84 BPM | DIASTOLIC BLOOD PRESSURE: 68 MMHG | RESPIRATION RATE: 16 BRPM | OXYGEN SATURATION: 95 % | TEMPERATURE: 97.5 F | HEIGHT: 71 IN | BODY MASS INDEX: 23.1 KG/M2

## 2023-12-26 DIAGNOSIS — E55.9 VITAMIN D DEFICIENCY: Chronic | ICD-10-CM

## 2023-12-26 DIAGNOSIS — Z00.00 ROUTINE PHYSICAL EXAMINATION: ICD-10-CM

## 2023-12-26 DIAGNOSIS — I25.84 CORONARY ARTERY CALCIFICATION OF NATIVE ARTERY: Chronic | ICD-10-CM

## 2023-12-26 DIAGNOSIS — F17.210 HEAVY CIGARETTE SMOKER: Chronic | ICD-10-CM

## 2023-12-26 DIAGNOSIS — N40.1 BENIGN PROSTATIC HYPERPLASIA WITH WEAK URINARY STREAM: Chronic | ICD-10-CM

## 2023-12-26 DIAGNOSIS — E78.2 MIXED HYPERLIPIDEMIA: Chronic | ICD-10-CM

## 2023-12-26 DIAGNOSIS — J41.1 MUCOPURULENT CHRONIC BRONCHITIS: Primary | Chronic | ICD-10-CM

## 2023-12-26 DIAGNOSIS — I25.10 CORONARY ARTERY CALCIFICATION OF NATIVE ARTERY: Chronic | ICD-10-CM

## 2023-12-26 DIAGNOSIS — D75.1 POLYCYTHEMIA SECONDARY TO SMOKING: Chronic | ICD-10-CM

## 2023-12-26 DIAGNOSIS — Z23 NEED FOR PNEUMOCOCCAL 20-VALENT CONJUGATE VACCINATION: ICD-10-CM

## 2023-12-26 DIAGNOSIS — F17.219 CIGARETTE NICOTINE DEPENDENCE WITH NICOTINE-INDUCED DISORDER: Chronic | ICD-10-CM

## 2023-12-26 DIAGNOSIS — R39.12 BENIGN PROSTATIC HYPERPLASIA WITH WEAK URINARY STREAM: Chronic | ICD-10-CM

## 2023-12-26 DIAGNOSIS — R73.01 IMPAIRED FASTING GLUCOSE: Chronic | ICD-10-CM

## 2023-12-26 DIAGNOSIS — Z51.81 THERAPEUTIC DRUG MONITORING: ICD-10-CM

## 2023-12-26 PROBLEM — R91.1 PULMONARY NODULE: Chronic | Status: ACTIVE | Noted: 2023-11-01

## 2023-12-26 NOTE — PROGRESS NOTES
12/26/2023    Patient Information  Hammad Justice                                                                                          [address] [facility] [address]      1960  [unfilled]  There is no work phone number on file.    Chief Complaint:     Follow-up recent emergency room visit for shortness of breath and exacerbation of COPD.  Follow-up coronary artery calcium score.    History of Present Illness:    Patient with history of medical problems as outlined below in the assessment and plan presents today to follow-up on recent emergency room visit for complaints of cough, wheezing, and shortness of breath.  Workup was essentially negative although patient was hypoxic and was treated with breathing treatments and subsequently sent home with prednisone and a Z-Jose.  Patient reports his symptoms have totally resolved.  He also had a CTA of the chest which ruled out DVT.  Patient heavy smoker and quit for about 7 months but unfortunately started back although is not smoking is much as he did.  We also had patient get a coronary artery calcium score which is quite high.  See below.  Past medical history reviewed and updated were necessary and this reveals he needs his Prevnar 20.  See below.  Also is due for his annual.  Patient is fasting today.    Review of Systems   Constitutional: Negative.   HENT: Negative.     Eyes: Negative.    Cardiovascular: Negative.  Negative for chest pain.   Respiratory: Negative.  Negative for cough, hemoptysis, shortness of breath, sputum production and wheezing.    Endocrine: Negative.    Hematologic/Lymphatic: Negative.    Skin: Negative.    Musculoskeletal: Negative.    Gastrointestinal: Negative.    Genitourinary: Negative.    Neurological: Negative.    Psychiatric/Behavioral: Negative.     Allergic/Immunologic: Negative.        Active Problems:    Patient Active Problem List   Diagnosis    Chronic obstructive pulmonary disease (COPD)    History of  colon polyps, 7/28/2020--tubular adenoma x 2.  Hyperplastic x 3.  09/12/2013--hyperplastic ×1.    Generalized osteoarthritis of multiple sites    Hyperlipidemia    Cigarette nicotine dependence with nicotine-induced disorder    Vitamin D deficiency    Therapeutic drug monitoring    Routine physical examination    Polycythemia secondary to smoking    Benign prostatic hyperplasia with weak urinary stream    Impaired fasting glucose    Pulmonary nodule, 12/16/2021--small left upper lobe micronodule.  Possible mucoid impaction right middle lobe.    Coronary artery calcification of native artery,November 8, 2023--coronary calcium score 2023.4    Heavy cigarette smoker         Past Medical History:   Diagnosis Date    Benign prostatic hyperplasia with weak urinary stream 03/20/2019 March 20, 2019--patient presents with a progressively worsening decreased urine flow over the past year or so.  He reports he just takes a long time for him to initiate and complete urination.  He has no pain or dysuria.  No blood noted in his urine.  Urinalysis today reveals 31-50 WBCs but there are 3-6 epithelial cells present as well as 1+ calcium oxalate crystals.  No bacteria seen.  No red bl    Calcium oxalate crystals present in urine 03/20/2019 March 20, 2019--patient presents with a progressively worsening decreased urine flow over the past year or so.  He reports he just takes a long time for him to initiate and complete urination.  He has no pain or dysuria.  No blood noted in his urine.  Urinalysis today reveals 31-50 WBCs but there are 3-6 epithelial cells present as well as 1+ calcium oxalate crystals.  No bacteria seen.  No red bl    Chronic obstructive pulmonary disease (COPD) 08/12/2013 08/12/2013--pulmonary function tests revealed FVC 3.35, 66% of predicted. Post bronchodilator FVC 3.31. FEV1 1.77, 45% of predicted. 1.8 to post bronchodilator. FEV1/FVC 53, Total lung capacity is 105% of predicted. Normal diffusion.  Moderately severe obstructive pattern with no reversibility.   08/12/2013--pulmonary consult. Pulmonary function tests revealed moderate to severe obstructive pattern    Cigarette nicotine dependence with nicotine-induced disorder 06/14/2016    COPD (chronic obstructive pulmonary disease) 08/12/2013 08/12/2013--pulmonary function tests revealed FVC 3.35, 66% of predicted. Post bronchodilator FVC 3.31. FEV1 1.77, 45% of predicted. 1.8 to post bronchodilator. FEV1/FVC 53, Total lung capacity is 105% of predicted. Normal diffusion. Moderately severe obstructive pattern with no reversibility.   08/12/2013--pulmonary consult. Pulmonary function tests revealed moderate to severe obstructive pattern. Diagnosis COPD. Chronic bronchitis type. Patient was started on Ventolin when necessary and Spiriva HandiHaler use once daily.    Coronary artery calcification of native artery,November 8, 2023--coronary calcium score 2023.4 11/01/2023 November 8, 2023--coronary calcium score 2023.4     December 16, 2021--low-dose chest CT revealed solitary left upper lobe 3 mm peripheral micronodule.  Focal areas of pleural-based thickening seen in the right middle lobe with an adjacent linear area of opacity likely to be mucoid impaction measuring 6 mm in length and less than 2 mm in width.  No other focal lung opacity seen.  Mild hyperexpansi    Generalized osteoarthritis of multiple sites 06/14/2016    Heavy cigarette smoker 11/01/2023    History of Benign essential hypertension 04/04/2012 04/04/2012--blood pressure remains low and patient had to discontinue lisinopril because of dizziness.   03/14/2012--blood pressure too low at 102/64. Lisinopril HCT decreased to 10/12.5.   02/22/2012--blood pressure elevated at 140/90. Lisinopril HCT 20/12.5 daily initiated.    History of colon polyps, 7/28/2020--tubular adenoma x 2.  Hyperplastic x 3.  09/12/2013--hyperplastic ×1. 09/12/2013 July 28, 2020--colonoscopy revealed a 1 mm polyp  in the rectum.  #4, 1 to 3 mm polyps in the rectosigmoid colon.  #1, 1 mm polyp in the rectum.  Polyps were removed.  Pathology returned tubular adenoma in the proximal ascending and sigmoid colon.  Hyperplastic x3.  09/12/2013--colonoscopy revealed a 3-4 mm polyp in the sigmoid colon which was biopsied and removed. Otherwise, normal colonoscopy to     History of pulmonary function tests 08/12/2013 08/12/2013--pulmonary function tests revealed FVC 3.35, 66% of predicted. Post bronchodilator FVC 3.31. FEV1 1.77, 45% of predicted. 1.8 to post bronchodilator. FEV1/FVC 53, Total lung capacity is 105% of predicted. Normal diffusion. Moderately severe obstructive pattern with no reversibility.    Hyperlipidemia 06/14/2016    Impaired fasting glucose 03/20/2019    Polycythemia secondary to smoking 07/07/2016 07/07/2016--routine physical exam.  CBC reveals hemoglobin elevated at 17.9 and hematocrit elevated at 53.5.  Patient has a somewhat heavy smoker and I feel this is the etiology of this and will not pursue hematologic workup, at least at the present time.  Patient is in the process of trying to discontinue cigarettes.  Aspirin 81 mg per day initiated.    Pulmonary nodule, 12/16/2021--small left upper lobe micronodule.  Possible mucoid impaction right middle lobe. 11/01/2023 December 16, 2021--low-dose chest CT revealed solitary left upper lobe 3 mm peripheral micronodule.  Focal areas of pleural-based thickening seen in the right middle lobe with an adjacent linear area of opacity likely to be mucoid impaction measuring 6 mm in length and less than 2 mm in width.  No other focal lung opacity seen.  Mild hyperexpansion suggestive of COPD.  Moderate coronary artery desmond    Vitamin D deficiency 06/14/2016         Past Surgical History:   Procedure Laterality Date    COLONOSCOPY  09/12/2013 09/12/2013--colonoscopy revealed a 3-4 mm polyp in the sigmoid colon which was biopsied and removed. Otherwise, normal  "colonoscopy to cecum with an excellent prep. Pathology returned hyperplastic.    COLONOSCOPY N/A 2020--colonoscopy revealed a 1 mm polyp in the rectum.  #4, 1 to 3 mm polyps in the rectosigmoid colon.  #1, 1 mm polyp in the rectum.  Polyps were removed.  Pathology returned tubular adenoma in the proximal ascending and sigmoid colon.  Hyperplastic x3.    KNEE ARTHROSCOPY Right 33 years of age    Age 33--right meniscus surgery.         No Known Allergies        Current Outpatient Medications:     albuterol sulfate  (90 Base) MCG/ACT inhaler, INHALE TWO PUFFS BY MOUTH EVERY 4 HOURS AS NEEDED FOR WHEEZING, Disp: 18 g, Rfl: 3    aspirin 81 MG EC tablet, Take 1 p.o. 3 days a week on Monday, Wednesday, and Friday for heart protection, Disp: , Rfl:     Umeclidinium-Vilanterol (Anoro Ellipta) 62.5-25 MCG/ACT aerosol powder  inhaler, INHALE 1 DOSE BY MOUTH DAILY, Disp: 180 each, Rfl: 1      Family History   Problem Relation Age of Onset    Anuerysm Brother         Cerebral Artery Aneurysm. Brother  of a brain aneurysm at age 52.    Hypertension Other         Benign Essential    Heart disease Other     Malig Hyperthermia Neg Hx          Social History     Socioeconomic History    Marital status:     Number of children: 2    Highest education level: 8th grade   Tobacco Use    Smoking status: Former     Packs/day: 2.00     Years: 5.00     Additional pack years: 0.00     Total pack years: 10.00     Types: Cigarettes     Quit date: 2023     Years since quittin.9    Smokeless tobacco: Never   Vaping Use    Vaping Use: Never used   Substance and Sexual Activity    Alcohol use: Yes     Comment: 6 BEERS A DAY     Drug use: No    Sexual activity: Yes     Partners: Female         Vitals:    23 0828   BP: 116/68   Pulse: 84   Resp: 16   Temp: 97.5 °F (36.4 °C)   TempSrc: Temporal   SpO2: 95%   Weight: 74.8 kg (165 lb)   Height: 180.3 cm (71\")        Body mass index is 23.01 " kg/m².      Physical Exam:    General: Alert and oriented x 3.  No acute distress.  Normal affect.  HEENT: Pupils equal, round, reactive to light; extraocular movements intact; sclerae nonicteric; pharynx, ear canals and TMs normal.  Neck: Without JVD, thyromegaly, bruit, or adenopathy.  Lungs: Clear to auscultation in all fields.  There is no wheezing and breath sounds are only slightly diminished throughout.  Heart: Regular rate and rhythm without murmur, rub, gallop, or click.  Abdomen: Soft, nontender, without hepatosplenomegaly or hernia.  Bowel sounds normal.  : Deferred.  Rectal: Deferred.  Extremities: Without clubbing, cyanosis, edema, or pulse deficit.  Neurologic: Intact without focal deficit.  Normal station and gait observed during ingress and egress from the examination room.  Skin: Without significant lesion.  Musculoskeletal: Unremarkable.    Lab/other results:    I reviewed the documentation from the emergency room visit in [facility].  Including laboratory and radiographic studies.    Assessment/Plan:     Diagnosis Plan   1. Chronic obstructive pulmonary disease (COPD)        2. Coronary artery calcification of native artery,November 8, 2023--coronary calcium score 2023.4  Ambulatory Referral to Cardiology      3. Cigarette nicotine dependence with nicotine-induced disorder        4. Heavy cigarette smoker  Ambulatory Referral to Cardiology      5. Hyperlipidemia  Comprehensive Metabolic Panel    NMR LipoProfile    TSH    T4, Free    T3, Free    Ambulatory Referral to Cardiology      6. Impaired fasting glucose  Comprehensive Metabolic Panel    Hemoglobin A1c    Ambulatory Referral to Cardiology      7. Polycythemia secondary to smoking  CBC (No Diff)      8. Vitamin D deficiency  Vitamin D,25-Hydroxy      9. Benign prostatic hyperplasia with weak urinary stream  PSA DIAGNOSTIC      10. Routine physical examination  CBC (No Diff)    Comprehensive Metabolic Panel    Hemoglobin A1c    NMR  LipoProfile    TSH    T4, Free    T3, Free    PSA DIAGNOSTIC    Urinalysis With Microscopic If Indicated (No Culture) - Urine, Clean Catch    Vitamin D,25-Hydroxy      11. Need for pneumococcal 20-valent conjugate vaccination  Pneumococcal Conjugate Vaccine 20-Valent (PCV20)      12. Therapeutic drug monitoring  Urinalysis With Microscopic If Indicated (No Culture) - Urine, Clean Catch        Patient with COPD and a recent exacerbation that is much better after treatment in the emergency room and limited overall it.  His lungs sound very clear today, particularly compared to some previous visits.  We reviewed the coronary artery calcification score which is quite high at over 2000 and I think he needs referral to cardiology to get their opinion on this matter.  We discussed cigarette smoking and does mention patient quit for 7 months and I encouraged him to do the same since he started back.  He also has hyperlipidemia that we will need to strongly look at given the coronary artery calcium.  Also has impaired fasting glucose.  Polycythemia will be rechecked along with vitamin D and PSA.    Plan is as follows: Fasting lab work today we will set up his physical as soon as he can fit into his schedule after the first of the year.  Cardiology referral.  Prevnar 20 given.        Procedures

## 2023-12-28 LAB
25(OH)D3+25(OH)D2 SERPL-MCNC: 27 NG/ML (ref 30–100)
ALBUMIN SERPL-MCNC: 4.2 G/DL (ref 3.9–4.9)
ALBUMIN/GLOB SERPL: 1.8 {RATIO} (ref 1.2–2.2)
ALP SERPL-CCNC: 92 IU/L (ref 44–121)
ALT SERPL-CCNC: 34 IU/L (ref 0–44)
APPEARANCE UR: CLEAR
AST SERPL-CCNC: 27 IU/L (ref 0–40)
BILIRUB SERPL-MCNC: 0.3 MG/DL (ref 0–1.2)
BILIRUB UR QL STRIP: NEGATIVE
BUN SERPL-MCNC: 12 MG/DL (ref 8–27)
BUN/CREAT SERPL: 12 (ref 10–24)
CALCIUM SERPL-MCNC: 9.3 MG/DL (ref 8.6–10.2)
CHLORIDE SERPL-SCNC: 99 MMOL/L (ref 96–106)
CHOLEST SERPL-MCNC: 233 MG/DL (ref 100–199)
CO2 SERPL-SCNC: 23 MMOL/L (ref 20–29)
COLOR UR: YELLOW
CREAT SERPL-MCNC: 0.97 MG/DL (ref 0.76–1.27)
EGFRCR SERPLBLD CKD-EPI 2021: 89 ML/MIN/1.73
ERYTHROCYTE [DISTWIDTH] IN BLOOD BY AUTOMATED COUNT: 11.9 % (ref 11.6–15.4)
GLOBULIN SER CALC-MCNC: 2.4 G/DL (ref 1.5–4.5)
GLUCOSE SERPL-MCNC: 91 MG/DL (ref 70–99)
GLUCOSE UR QL STRIP: NEGATIVE
HBA1C MFR BLD: 6.2 % (ref 4.8–5.6)
HCT VFR BLD AUTO: 51.5 % (ref 37.5–51)
HDL SERPL-SCNC: 41.4 UMOL/L
HDLC SERPL-MCNC: 54 MG/DL
HGB BLD-MCNC: 17.1 G/DL (ref 13–17.7)
HGB UR QL STRIP: NEGATIVE
KETONES UR QL STRIP: NEGATIVE
LDL SERPL QN: 20.1 NM
LDL SERPL-SCNC: 1925 NMOL/L
LDL SMALL SERPL-SCNC: 1033 NMOL/L
LDLC SERPL CALC-MCNC: 121 MG/DL (ref 0–99)
LEUKOCYTE ESTERASE UR QL STRIP: NEGATIVE
MCH RBC QN AUTO: 31.7 PG (ref 26.6–33)
MCHC RBC AUTO-ENTMCNC: 33.2 G/DL (ref 31.5–35.7)
MCV RBC AUTO: 96 FL (ref 79–97)
MICRO URNS: NORMAL
NITRITE UR QL STRIP: NEGATIVE
PH UR STRIP: 7 [PH] (ref 5–7.5)
PLATELET # BLD AUTO: 326 X10E3/UL (ref 150–450)
POTASSIUM SERPL-SCNC: 4.9 MMOL/L (ref 3.5–5.2)
PROT SERPL-MCNC: 6.6 G/DL (ref 6–8.5)
PROT UR QL STRIP: NEGATIVE
PSA SERPL-MCNC: 2.1 NG/ML (ref 0–4)
RBC # BLD AUTO: 5.39 X10E6/UL (ref 4.14–5.8)
SODIUM SERPL-SCNC: 139 MMOL/L (ref 134–144)
SP GR UR STRIP: 1.02 (ref 1–1.03)
T3FREE SERPL-MCNC: 3.7 PG/ML (ref 2–4.4)
T4 FREE SERPL-MCNC: 1.25 NG/DL (ref 0.82–1.77)
TRIGL SERPL-MCNC: 334 MG/DL (ref 0–149)
TSH SERPL DL<=0.005 MIU/L-ACNC: 0.24 UIU/ML (ref 0.45–4.5)
UROBILINOGEN UR STRIP-MCNC: 0.2 MG/DL (ref 0.2–1)
WBC # BLD AUTO: 6 X10E3/UL (ref 3.4–10.8)

## 2024-01-18 ENCOUNTER — OFFICE VISIT (OUTPATIENT)
Age: 64
End: 2024-01-18
Payer: COMMERCIAL

## 2024-01-18 VITALS
DIASTOLIC BLOOD PRESSURE: 72 MMHG | SYSTOLIC BLOOD PRESSURE: 126 MMHG | HEART RATE: 91 BPM | WEIGHT: 170 LBS | HEIGHT: 71 IN | BODY MASS INDEX: 23.8 KG/M2

## 2024-01-18 DIAGNOSIS — I25.10 CORONARY ARTERY CALCIFICATION OF NATIVE ARTERY: Chronic | ICD-10-CM

## 2024-01-18 DIAGNOSIS — R06.09 DOE (DYSPNEA ON EXERTION): Primary | ICD-10-CM

## 2024-01-18 DIAGNOSIS — I25.84 CORONARY ARTERY CALCIFICATION OF NATIVE ARTERY: Chronic | ICD-10-CM

## 2024-01-18 PROCEDURE — 99204 OFFICE O/P NEW MOD 45 MIN: CPT | Performed by: INTERNAL MEDICINE

## 2024-01-18 PROCEDURE — 93000 ELECTROCARDIOGRAM COMPLETE: CPT | Performed by: INTERNAL MEDICINE

## 2024-01-18 RX ORDER — ATORVASTATIN CALCIUM 40 MG/1
40 TABLET, FILM COATED ORAL DAILY
Qty: 90 TABLET | Refills: 3 | Status: SHIPPED | OUTPATIENT
Start: 2024-01-18

## 2024-01-18 NOTE — PROGRESS NOTES
[facility] New Patient Office Note     Encounter Date:24  Patient:Hammad Justice  :1960  MRN:[id number]    Referring Provider: Niko Villasenor MD    Consulted for: Coronary artery calcification    Chief Complaint:   Chief Complaint   Patient presents with     Coronary artery calcification of native artery    Hyperlipidemia    Establish Care         History of Presenting Illness:      Mr. Justice is a 63 y.o. gentleman with past medical history notable for tobacco abuse, COPD, and coronary artery calcification who presents to our office for initial evaluation regarding his cardiovascular risk.  In general patient denies any overt symptoms he does have underlying shortness of breath and dyspnea on exertion which has been going on for a number of years related to his COPD.  He follows with pulmonology get screening CT scans looking for cancer these have been noted significant coronary artery calcifications in the past.  He underwent formal coronary artery calcification scoring in November showing significantly elevated score.  Given these findings he did discuss these with his primary care physician was recommended that he start aspirin and cholesterol medicine however he wanted to talk with a cardiologist first he was referred to me for further evaluation.  He reports having a stress test a number of years ago at the time he was a heavy smoker had troubles finishing it due to coughing and shortness of breath.  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  Does not do a lot of heavy exertion or heavy exercise      Review of Systems:  Review of Systems   Constitutional: Negative.   HENT: Negative.     Eyes: Negative.    Cardiovascular: Negative.  Positive for dyspnea on exertion.   Respiratory: Negative.  Positive for shortness of breath.    Endocrine: Negative.    Hematologic/Lymphatic: Negative.    Skin: Negative.    Musculoskeletal:  Negative.    Gastrointestinal: Negative.    Genitourinary: Negative.    Neurological: Negative.    Psychiatric/Behavioral: Negative.     Allergic/Immunologic: Negative.        Current Outpatient Medications on File Prior to Visit   Medication Sig Dispense Refill    albuterol sulfate  (90 Base) MCG/ACT inhaler INHALE TWO PUFFS BY MOUTH EVERY 4 HOURS AS NEEDED FOR WHEEZING 18 g 3    aspirin 81 MG EC tablet Take 1 p.o. 3 days a week on Monday, Wednesday, and Friday for heart protection      Umeclidinium-Vilanterol (Anoro Ellipta) 62.5-25 MCG/ACT aerosol powder  inhaler INHALE 1 DOSE BY MOUTH DAILY 180 each 1     No current facility-administered medications on file prior to visit.       No Known Allergies    Past Medical History:   Diagnosis Date    Benign prostatic hyperplasia with weak urinary stream 03/20/2019 March 20, 2019--patient presents with a progressively worsening decreased urine flow over the past year or so.  He reports he just takes a long time for him to initiate and complete urination.  He has no pain or dysuria.  No blood noted in his urine.  Urinalysis today reveals 31-50 WBCs but there are 3-6 epithelial cells present as well as 1+ calcium oxalate crystals.  No bacteria seen.  No red bl    Calcium oxalate crystals present in urine 03/20/2019 March 20, 2019--patient presents with a progressively worsening decreased urine flow over the past year or so.  He reports he just takes a long time for him to initiate and complete urination.  He has no pain or dysuria.  No blood noted in his urine.  Urinalysis today reveals 31-50 WBCs but there are 3-6 epithelial cells present as well as 1+ calcium oxalate crystals.  No bacteria seen.  No red bl    Chronic obstructive pulmonary disease (COPD) 08/12/2013 08/12/2013--pulmonary function tests revealed FVC 3.35, 66% of predicted. Post bronchodilator FVC 3.31. FEV1 1.77, 45% of predicted. 1.8 to post bronchodilator. FEV1/FVC 53, Total lung capacity  is 105% of predicted. Normal diffusion. Moderately severe obstructive pattern with no reversibility.   08/12/2013--pulmonary consult. Pulmonary function tests revealed moderate to severe obstructive pattern    Cigarette nicotine dependence with nicotine-induced disorder 06/14/2016    COPD (chronic obstructive pulmonary disease) 08/12/2013 08/12/2013--pulmonary function tests revealed FVC 3.35, 66% of predicted. Post bronchodilator FVC 3.31. FEV1 1.77, 45% of predicted. 1.8 to post bronchodilator. FEV1/FVC 53, Total lung capacity is 105% of predicted. Normal diffusion. Moderately severe obstructive pattern with no reversibility.   08/12/2013--pulmonary consult. Pulmonary function tests revealed moderate to severe obstructive pattern. Diagnosis COPD. Chronic bronchitis type. Patient was started on Ventolin when necessary and Spiriva HandiHaler use once daily.    Coronary artery calcification of native artery,November 8, 2023--coronary calcium score 2023.4 11/01/2023 November 8, 2023--coronary calcium score 2023.4     December 16, 2021--low-dose chest CT revealed solitary left upper lobe 3 mm peripheral micronodule.  Focal areas of pleural-based thickening seen in the right middle lobe with an adjacent linear area of opacity likely to be mucoid impaction measuring 6 mm in length and less than 2 mm in width.  No other focal lung opacity seen.  Mild hyperexpansi    Generalized osteoarthritis of multiple sites 06/14/2016    Heavy cigarette smoker 11/01/2023    History of Benign essential hypertension 04/04/2012 04/04/2012--blood pressure remains low and patient had to discontinue lisinopril because of dizziness.   03/14/2012--blood pressure too low at 102/64. Lisinopril HCT decreased to 10/12.5.   02/22/2012--blood pressure elevated at 140/90. Lisinopril HCT 20/12.5 daily initiated.    History of colon polyps, 7/28/2020--tubular adenoma x 2.  Hyperplastic x 3.  09/12/2013--hyperplastic ×1. 09/12/2013 July 28,  2020--colonoscopy revealed a 1 mm polyp in the rectum.  #4, 1 to 3 mm polyps in the rectosigmoid colon.  #1, 1 mm polyp in the rectum.  Polyps were removed.  Pathology returned tubular adenoma in the proximal ascending and sigmoid colon.  Hyperplastic x3.  09/12/2013--colonoscopy revealed a 3-4 mm polyp in the sigmoid colon which was biopsied and removed. Otherwise, normal colonoscopy to     History of pulmonary function tests 08/12/2013 08/12/2013--pulmonary function tests revealed FVC 3.35, 66% of predicted. Post bronchodilator FVC 3.31. FEV1 1.77, 45% of predicted. 1.8 to post bronchodilator. FEV1/FVC 53, Total lung capacity is 105% of predicted. Normal diffusion. Moderately severe obstructive pattern with no reversibility.    Hyperlipidemia 06/14/2016    Impaired fasting glucose 03/20/2019    Polycythemia secondary to smoking 07/07/2016 07/07/2016--routine physical exam.  CBC reveals hemoglobin elevated at 17.9 and hematocrit elevated at 53.5.  Patient has a somewhat heavy smoker and I feel this is the etiology of this and will not pursue hematologic workup, at least at the present time.  Patient is in the process of trying to discontinue cigarettes.  Aspirin 81 mg per day initiated.    Pulmonary nodule, 12/16/2021--small left upper lobe micronodule.  Possible mucoid impaction right middle lobe. 11/01/2023 December 16, 2021--low-dose chest CT revealed solitary left upper lobe 3 mm peripheral micronodule.  Focal areas of pleural-based thickening seen in the right middle lobe with an adjacent linear area of opacity likely to be mucoid impaction measuring 6 mm in length and less than 2 mm in width.  No other focal lung opacity seen.  Mild hyperexpansion suggestive of COPD.  Moderate coronary artery desmond    Vitamin D deficiency 06/14/2016       Past Surgical History:   Procedure Laterality Date    COLONOSCOPY  09/12/2013 09/12/2013--colonoscopy revealed a 3-4 mm polyp in the sigmoid colon which was  "biopsied and removed. Otherwise, normal colonoscopy to cecum with an excellent prep. Pathology returned hyperplastic.    COLONOSCOPY N/A 2020--colonoscopy revealed a 1 mm polyp in the rectum.  #4, 1 to 3 mm polyps in the rectosigmoid colon.  #1, 1 mm polyp in the rectum.  Polyps were removed.  Pathology returned tubular adenoma in the proximal ascending and sigmoid colon.  Hyperplastic x3.    KNEE ARTHROSCOPY Right 33 years of age    Age 33--right meniscus surgery.       Social History     Socioeconomic History    Marital status:     Number of children: 2    Highest education level: 8th grade   Tobacco Use    Smoking status: Former     Packs/day: 2.00     Years: 5.00     Additional pack years: 0.00     Total pack years: 10.00     Types: Cigarettes     Quit date: 2023     Years since quittin.0    Smokeless tobacco: Never    Tobacco comments:     Caffeine use    Vaping Use    Vaping Use: Never used   Substance and Sexual Activity    Alcohol use: Yes     Comment: 6 BEERS A DAY     Drug use: No    Sexual activity: Yes     Partners: Female       Family History   Problem Relation Age of Onset    Cancer Mother     Heart attack Father     Anuerysm Brother         Cerebral Artery Aneurysm. Brother  of a brain aneurysm at age 52.    Hypertension Other         Benign Essential    Heart disease Other     Malig Hyperthermia Neg Hx        The following portions of the patient's history were reviewed and updated as appropriate: allergies, current medications, past family history, past medical history, past social history, past surgical history and problem list.       Objective:       Vitals:    24 1432   BP: 126/72   BP Location: Left arm   Patient Position: Sitting   Pulse: 91   Weight: 77.1 kg (170 lb)   Height: 180.3 cm (71\")       Body mass index is 23.71 kg/m².    Physical Exam:  Constitutional: Well appearing, Well-developed, No acute distress   HENT: Oropharynx clear and " membrane moist  Eyes: Normal conjunctiva, no sclera icterus.  Neck: Supple, no carotid bruit bilaterally.  Cardiovascular: Regular rate and rhythm, No Murmur, No bilateral lower extremity edema.  Pulmonary: Normal respiratory effort, Normal lung sounds, mild wheezing.  Neurological: Alert and orient x 3.   Skin: Warm, dry, no ecchymosis, no rash.  Psych: Appropriate mood and affect. Normal judgment and insight.      Lab Results   Component Value Date     12/26/2023     04/12/2023    K 4.9 12/26/2023    K 4.4 04/12/2023    CL 99 12/26/2023     04/12/2023    CO2 23 12/26/2023    CO2 28.0 04/12/2023    BUN 12 12/26/2023    BUN 15 04/12/2023    CREATININE 0.97 12/26/2023    CREATININE 0.94 04/12/2023    EGFRIFNONA 87 12/02/2021    EGFRIFNONA 90 04/13/2021    EGFRIFAFRI 104 03/16/2020    EGFRIFAFRI 98 03/13/2019    GLUCOSE 91 12/26/2023    GLUCOSE 96 04/12/2023    CALCIUM 9.3 12/26/2023    CALCIUM 9.0 04/12/2023    PROTENTOTREF 6.6 12/26/2023    PROTENTOTREF 6.8 04/12/2023    ALBUMIN 4.2 12/26/2023    ALBUMIN 4.3 04/12/2023    BILITOT 0.3 12/26/2023    BILITOT 0.5 04/12/2023    AST 27 12/26/2023    AST 33 04/12/2023    ALT 34 12/26/2023    ALT 41 04/12/2023     Lab Results   Component Value Date    WBC 6.0 12/26/2023    WBC 3.98 04/12/2023    HGB 17.1 12/26/2023    HGB 15.7 04/12/2023    HCT 51.5 (H) 12/26/2023    HCT 44.6 04/12/2023    MCV 96 12/26/2023    MCV 92.7 04/12/2023     12/26/2023     04/12/2023     Lab Results   Component Value Date    TRIG 334 (H) 12/26/2023    TRIG 386 (H) 04/12/2023     (H) 06/30/2016     The ASCVD Risk score (Ho KELLY, et al., 2019) failed to calculate for the following reasons:    The patient has a prior MI or stroke diagnosis     Lab Results   Component Value Date    BNP 84 12/16/2023     Lab Results   Component Value Date    CKTOTAL 206 (H) 12/02/2021     Lab Results   Component Value Date    TSH 0.241 (L) 12/26/2023    TSH 1.190 04/12/2023          ECG 12 Lead    Date/Time: 1/18/2024 3:22 PM  Performed by: Ric Chase MD    Authorized by: Ric Chase MD  Comparison: compared with previous ECG from 1/27/2018  Similar to previous ECG  Rhythm: sinus rhythm  Conduction: incomplete right bundle branch block        Coronary artery calcification scoring 11/8/2023:  Total score 2023 which is 90th percentile for age  Left main: 0  LAD: 461  LCx: 373  RCA: 775        Assessment:          Diagnosis Plan   1. SWENSON (dyspnea on exertion)  Treadmill Stress Test    ECG 12 Lead      2. Coronary artery calcification of native artery,November 8, 2023--coronary calcium score 2023.4  Treadmill Stress Test    Lipid Panel    Comprehensive Metabolic Panel    ECG 12 Lead             Plan:       Mr. Justice is a 63 y.o. gentleman with past medical history notable for tobacco abuse, COPD, and coronary artery calcification who presents to our office for initial evaluation regarding his cardiovascular risk.  He denies any overt chest pain symptoms he does not have any significant dyspnea on exertion but does have underlying shortness of breath and dyspnea exertion.  Somewhat hard to tease out how much his symptoms could be lung or potentially cardiac related.  We did discuss the nature of coronary disease and where coronary artery calcification is helpful and further risk ratified people first and foremost as he is already started on aspirin would agree that starting cholesterol medicine is her next best step.  I would also like to confirm that he truly is not without any major symptoms and we will get a treadmill stress test to confirm.  If he does well on his treadmill stress test would continue aggressive risk factor modification.    Coronary artery calcification:  Denies any overt symptoms  Follow-up on treadmill stress test to confirm patient is asymptomatic and rule out any high risk findings  Continue aspirin and starting statin    Mixed hyperlipidemia:  Starting  statin therapy  Repeat lipid panel and CMP ordered for 1 month      Follow up:  12 months      Thank you for allowing me to participate in the care of Hammad Justice. Feel free to contact me directly with any further questions or concerns.    Ric Chase MD  [facility]  01/18/24  15:30 EST

## 2024-01-24 ENCOUNTER — OFFICE VISIT (OUTPATIENT)
Dept: INTERNAL MEDICINE | Facility: CLINIC | Age: 64
End: 2024-01-24
Payer: COMMERCIAL

## 2024-01-24 ENCOUNTER — TELEPHONE (OUTPATIENT)
Dept: CARDIOLOGY | Facility: CLINIC | Age: 64
End: 2024-01-24
Payer: COMMERCIAL

## 2024-01-24 VITALS
OXYGEN SATURATION: 93 % | SYSTOLIC BLOOD PRESSURE: 120 MMHG | DIASTOLIC BLOOD PRESSURE: 64 MMHG | HEIGHT: 71 IN | WEIGHT: 176 LBS | HEART RATE: 85 BPM | TEMPERATURE: 96.8 F | RESPIRATION RATE: 14 BRPM | BODY MASS INDEX: 24.64 KG/M2

## 2024-01-24 DIAGNOSIS — Z86.010 HISTORY OF COLON POLYPS: Chronic | ICD-10-CM

## 2024-01-24 DIAGNOSIS — E55.9 VITAMIN D DEFICIENCY: Chronic | ICD-10-CM

## 2024-01-24 DIAGNOSIS — R39.12 BENIGN PROSTATIC HYPERPLASIA WITH WEAK URINARY STREAM: Chronic | ICD-10-CM

## 2024-01-24 DIAGNOSIS — F17.210 HEAVY CIGARETTE SMOKER: Chronic | ICD-10-CM

## 2024-01-24 DIAGNOSIS — J41.1 MUCOPURULENT CHRONIC BRONCHITIS: Chronic | ICD-10-CM

## 2024-01-24 DIAGNOSIS — F17.219 CIGARETTE NICOTINE DEPENDENCE WITH NICOTINE-INDUCED DISORDER: Chronic | ICD-10-CM

## 2024-01-24 DIAGNOSIS — N40.1 BENIGN PROSTATIC HYPERPLASIA WITH WEAK URINARY STREAM: Chronic | ICD-10-CM

## 2024-01-24 DIAGNOSIS — I25.10 CORONARY ARTERY CALCIFICATION OF NATIVE ARTERY: Chronic | ICD-10-CM

## 2024-01-24 DIAGNOSIS — Z86.16 HISTORY OF 2019 NOVEL CORONAVIRUS DISEASE (COVID-19): ICD-10-CM

## 2024-01-24 DIAGNOSIS — Z00.00 ROUTINE PHYSICAL EXAMINATION: Primary | ICD-10-CM

## 2024-01-24 DIAGNOSIS — M15.9 GENERALIZED OSTEOARTHRITIS OF MULTIPLE SITES: Chronic | ICD-10-CM

## 2024-01-24 DIAGNOSIS — D75.1 POLYCYTHEMIA SECONDARY TO SMOKING: Chronic | ICD-10-CM

## 2024-01-24 DIAGNOSIS — Z51.81 THERAPEUTIC DRUG MONITORING: ICD-10-CM

## 2024-01-24 DIAGNOSIS — I25.84 CORONARY ARTERY CALCIFICATION OF NATIVE ARTERY: Chronic | ICD-10-CM

## 2024-01-24 DIAGNOSIS — R91.1 PULMONARY NODULE: Chronic | ICD-10-CM

## 2024-01-24 DIAGNOSIS — E78.2 MIXED HYPERLIPIDEMIA: Chronic | ICD-10-CM

## 2024-01-24 DIAGNOSIS — R73.01 IMPAIRED FASTING GLUCOSE: Chronic | ICD-10-CM

## 2024-01-24 RX ORDER — ATORVASTATIN CALCIUM 40 MG/1
40 TABLET, FILM COATED ORAL DAILY
Start: 2024-01-24

## 2024-01-24 NOTE — PROGRESS NOTES
01/24/2024    Patient Information  Hammad Justice                                                                                          [address]  [facility] [address]      1960  [unfilled]  There is no work phone number on file.    Chief Complaint:     Annual physical examination/preventative visit and follow-up blood work in order to monitor chronic medical issues.  No new acute complaints.    History of Present Illness:    Patient with chronic medical problems as noted below in assessment plan presents today for his routine physical.  His past medical history reviewed and updated were necessary including health maintenance parameters.  This reveals he will be up-to-date or else accounted for after today's visit.    Review of Systems   Constitutional: Negative.   HENT: Negative.     Eyes: Negative.    Cardiovascular:  Positive for dyspnea on exertion. Negative for chest pain.   Respiratory:  Positive for cough and sputum production.    Endocrine: Negative.    Hematologic/Lymphatic: Negative.    Skin: Negative.    Musculoskeletal: Negative.    Gastrointestinal: Negative.    Genitourinary: Negative.    Neurological: Negative.    Psychiatric/Behavioral: Negative.     Allergic/Immunologic: Negative.        Active Problems:    Patient Active Problem List   Diagnosis    Chronic obstructive pulmonary disease (COPD)    History of colon polyps, 7/28/2020--tubular adenoma x 2.  Hyperplastic x 3.  09/12/2013--hyperplastic ×1.    Generalized osteoarthritis of multiple sites    Hyperlipidemia    Cigarette nicotine dependence with nicotine-induced disorder    Vitamin D deficiency    Therapeutic drug monitoring    Routine physical examination    Polycythemia secondary to smoking    Benign prostatic hyperplasia with weak urinary stream    Impaired fasting glucose    Pulmonary nodule, 12/16/2021--small left upper lobe micronodule.  Possible mucoid impaction right middle lobe.    Coronary artery  calcification of native artery,November 8, 2023--coronary calcium score 2023.4    Heavy cigarette smoker         Past Medical History:   Diagnosis Date    Benign prostatic hyperplasia with weak urinary stream 03/20/2019 March 20, 2019--patient presents with a progressively worsening decreased urine flow over the past year or so.  He reports he just takes a long time for him to initiate and complete urination.  He has no pain or dysuria.  No blood noted in his urine.  Urinalysis today reveals 31-50 WBCs but there are 3-6 epithelial cells present as well as 1+ calcium oxalate crystals.  No bacteria seen.  No red bl    Calcium oxalate crystals present in urine 03/20/2019 March 20, 2019--patient presents with a progressively worsening decreased urine flow over the past year or so.  He reports he just takes a long time for him to initiate and complete urination.  He has no pain or dysuria.  No blood noted in his urine.  Urinalysis today reveals 31-50 WBCs but there are 3-6 epithelial cells present as well as 1+ calcium oxalate crystals.  No bacteria seen.  No red bl    Chronic obstructive pulmonary disease (COPD) 08/12/2013 08/12/2013--pulmonary function tests revealed FVC 3.35, 66% of predicted. Post bronchodilator FVC 3.31. FEV1 1.77, 45% of predicted. 1.8 to post bronchodilator. FEV1/FVC 53, Total lung capacity is 105% of predicted. Normal diffusion. Moderately severe obstructive pattern with no reversibility.   08/12/2013--pulmonary consult. Pulmonary function tests revealed moderate to severe obstructive pattern    Cigarette nicotine dependence with nicotine-induced disorder 06/14/2016    COPD (chronic obstructive pulmonary disease) 08/12/2013 08/12/2013--pulmonary function tests revealed FVC 3.35, 66% of predicted. Post bronchodilator FVC 3.31. FEV1 1.77, 45% of predicted. 1.8 to post bronchodilator. FEV1/FVC 53, Total lung capacity is 105% of predicted. Normal diffusion. Moderately severe obstructive  pattern with no reversibility.   08/12/2013--pulmonary consult. Pulmonary function tests revealed moderate to severe obstructive pattern. Diagnosis COPD. Chronic bronchitis type. Patient was started on Ventolin when necessary and Spiriva HandiHaler use once daily.    Coronary artery calcification of native artery,November 8, 2023--coronary calcium score 2023.4 11/01/2023 November 8, 2023--coronary calcium score 2023.4     December 16, 2021--low-dose chest CT revealed solitary left upper lobe 3 mm peripheral micronodule.  Focal areas of pleural-based thickening seen in the right middle lobe with an adjacent linear area of opacity likely to be mucoid impaction measuring 6 mm in length and less than 2 mm in width.  No other focal lung opacity seen.  Mild hyperexpansi    Generalized osteoarthritis of multiple sites 06/14/2016    Heavy cigarette smoker 11/01/2023    History of Benign essential hypertension 04/04/2012 04/04/2012--blood pressure remains low and patient had to discontinue lisinopril because of dizziness.   03/14/2012--blood pressure too low at 102/64. Lisinopril HCT decreased to 10/12.5.   02/22/2012--blood pressure elevated at 140/90. Lisinopril HCT 20/12.5 daily initiated.    History of colon polyps, 7/28/2020--tubular adenoma x 2.  Hyperplastic x 3.  09/12/2013--hyperplastic ×1. 09/12/2013 July 28, 2020--colonoscopy revealed a 1 mm polyp in the rectum.  #4, 1 to 3 mm polyps in the rectosigmoid colon.  #1, 1 mm polyp in the rectum.  Polyps were removed.  Pathology returned tubular adenoma in the proximal ascending and sigmoid colon.  Hyperplastic x3.  09/12/2013--colonoscopy revealed a 3-4 mm polyp in the sigmoid colon which was biopsied and removed. Otherwise, normal colonoscopy to     History of pulmonary function tests 08/12/2013 08/12/2013--pulmonary function tests revealed FVC 3.35, 66% of predicted. Post bronchodilator FVC 3.31. FEV1 1.77, 45% of predicted. 1.8 to post bronchodilator.  FEV1/FVC 53, Total lung capacity is 105% of predicted. Normal diffusion. Moderately severe obstructive pattern with no reversibility.    Hyperlipidemia 06/14/2016    Impaired fasting glucose 03/20/2019    Polycythemia secondary to smoking 07/07/2016 07/07/2016--routine physical exam.  CBC reveals hemoglobin elevated at 17.9 and hematocrit elevated at 53.5.  Patient has a somewhat heavy smoker and I feel this is the etiology of this and will not pursue hematologic workup, at least at the present time.  Patient is in the process of trying to discontinue cigarettes.  Aspirin 81 mg per day initiated.    Pulmonary nodule, 12/16/2021--small left upper lobe micronodule.  Possible mucoid impaction right middle lobe. 11/01/2023 December 16, 2021--low-dose chest CT revealed solitary left upper lobe 3 mm peripheral micronodule.  Focal areas of pleural-based thickening seen in the right middle lobe with an adjacent linear area of opacity likely to be mucoid impaction measuring 6 mm in length and less than 2 mm in width.  No other focal lung opacity seen.  Mild hyperexpansion suggestive of COPD.  Moderate coronary artery desmond    Vitamin D deficiency 06/14/2016         Past Surgical History:   Procedure Laterality Date    COLONOSCOPY  09/12/2013 09/12/2013--colonoscopy revealed a 3-4 mm polyp in the sigmoid colon which was biopsied and removed. Otherwise, normal colonoscopy to cecum with an excellent prep. Pathology returned hyperplastic.    COLONOSCOPY N/A 7/28/2020 July 28, 2020--colonoscopy revealed a 1 mm polyp in the rectum.  #4, 1 to 3 mm polyps in the rectosigmoid colon.  #1, 1 mm polyp in the rectum.  Polyps were removed.  Pathology returned tubular adenoma in the proximal ascending and sigmoid colon.  Hyperplastic x3.    KNEE ARTHROSCOPY Right 33 years of age    Age 33--right meniscus surgery.         No Known Allergies        Current Outpatient Medications:     albuterol sulfate  (90 Base) MCG/ACT  "inhaler, INHALE TWO PUFFS BY MOUTH EVERY 4 HOURS AS NEEDED FOR WHEEZING, Disp: 18 g, Rfl: 3    aspirin 81 MG EC tablet, Take 1 p.o. 3 days a week on Monday, Wednesday, and Friday for heart protection, Disp: , Rfl:     atorvastatin (LIPITOR) 40 MG tablet, Take 1 tablet by mouth Daily., Disp: , Rfl:     Umeclidinium-Vilanterol (Anoro Ellipta) 62.5-25 MCG/ACT aerosol powder  inhaler, INHALE 1 DOSE BY MOUTH DAILY, Disp: 180 each, Rfl: 1      Family History   Problem Relation Age of Onset    Cancer Mother     Heart attack Father     Anuerysm Brother         Cerebral Artery Aneurysm. Brother  of a brain aneurysm at age 52.    Hypertension Other         Benign Essential    Heart disease Other     Malig Hyperthermia Neg Hx          Social History     Socioeconomic History    Marital status:     Number of children: 2    Highest education level: 8th grade   Tobacco Use    Smoking status: Former     Packs/day: 2.00     Years: 5.00     Additional pack years: 0.00     Total pack years: 10.00     Types: Cigarettes     Quit date: 2023     Years since quittin.0    Smokeless tobacco: Never    Tobacco comments:     Caffeine use    Vaping Use    Vaping Use: Never used   Substance and Sexual Activity    Alcohol use: Yes     Comment: 6 BEERS A DAY     Drug use: No    Sexual activity: Yes     Partners: Female         Vitals:    24 1059   BP: 120/64   Pulse: 85   Resp: 14   Temp: 96.8 °F (36 °C)   TempSrc: Temporal   SpO2: 93%   Weight: 79.8 kg (176 lb)   Height: 180.3 cm (71\")        Body mass index is 24.55 kg/m².      Physical Exam:    General: Alert and oriented x 3.  No acute distress.  Normal affect.  HEENT: Pupils equal, round, reactive to light; extraocular movements intact; sclerae nonicteric; pharynx, ear canals and TMs normal.  Neck: Without JVD, thyromegaly, bruit, or adenopathy.  Lungs: Somewhat diminished breath sounds throughout.  Bilateral scattered rhonchi but no signs of consolidation.  Mild end " expiratory wheezes noted bilaterally.  Heart: Regular rate and rhythm without murmur, rub, gallop, or click.  Abdomen: Soft, nontender, without hepatosplenomegaly or hernia.  Bowel sounds normal.  : Deferred.  Rectal: Deferred.  Extremities: Without clubbing, cyanosis, edema, or pulse deficit.  Neurologic: Intact without focal deficit.  Normal station and gait observed during ingress and egress from the examination room.  Skin: Without significant lesion.  Musculoskeletal: Unremarkable.    Lab/other results:    CBC is normal except hematocrit elevated at 51.5.  CMP is normal.  Hemoglobin A1c 6.2.  Total cholesterol is 233, triglycerides elevated 334, LDL particle number elevated 1925, HDL particle number normal at 41.4.  Thyroid function tests are normal except TSH is slightly suppressed at 0.241.  PSA normal at 2.1.  Urinalysis normal.  Vitamin D is a little low at 27.0.    Assessment/Plan:     Diagnosis Plan   1. Routine physical examination        2. Impaired fasting glucose  Comprehensive Metabolic Panel    Hemoglobin A1c      3. Hyperlipidemia  Comprehensive Metabolic Panel    NMR LipoProfile    T4, Free    T3, Free    TSH    atorvastatin (LIPITOR) 40 MG tablet      4. Polycythemia secondary to smoking  CBC (No Diff)      5. Heavy cigarette smoker        6. History of colon polyps, 7/28/2020--tubular adenoma x 2.  Hyperplastic x 3.  09/12/2013--hyperplastic ×1.        7. Generalized osteoarthritis of multiple sites        8. Coronary artery calcification of native artery,November 8, 2023--coronary calcium score 2023.4        9. Cigarette nicotine dependence with nicotine-induced disorder        10. Chronic obstructive pulmonary disease (COPD)        11. Benign prostatic hyperplasia with weak urinary stream        12. Pulmonary nodule, 12/16/2021--small left upper lobe micronodule.  Possible mucoid impaction right middle lobe.        13. Vitamin D deficiency        14. Therapeutic drug monitoring        15.  History of 2019 novel coronavirus disease (COVID-19)  SARS-CoV-2 Antibodies, Nucleocapsid (Natural Immunity)        Patient presents for his routine annual physical/preventative visit and seems to be doing fairly well given the chronic medical problems he has.  His biggest issue is that of lung disease due to heavy cigarette smoking patient has not been able to quit smoking.  He also has high risk for coronary artery disease and I have referred him to the cardiologist regarding this.  He has a history of pulmonary nodule which is a micronodule that we are monitoring and appears to be stable.  History of colon polyps and is up-to-date on his colonoscopy.  BPH is symptomatic but not enough to warrant medication.  Polycythemia is directly related to smoking and once again I encouraged him to stop smoking but did not do formal smoking cessation encounter today.    Plan is as follows: Patient is scheduled for stress test tomorrow.  The cardiologist placed him on generic Lipitor 40 mg/day.  This was after the lab work was done and after reviewing the lipid profile, he certainly needs to be on something to lower the cholesterol and lower his risk.  We will set patient up for fasting lab work to assess his cholesterol in about 2 months after he has been on the Lipitor and adequate amount of time.      Procedures

## 2024-01-25 ENCOUNTER — HOSPITAL ENCOUNTER (OUTPATIENT)
Dept: CARDIOLOGY | Facility: HOSPITAL | Age: 64
Discharge: HOME | End: 2024-01-25
Admitting: INTERNAL MEDICINE
Payer: COMMERCIAL

## 2024-01-25 DIAGNOSIS — I25.84 CORONARY ARTERY CALCIFICATION OF NATIVE ARTERY: Chronic | ICD-10-CM

## 2024-01-25 DIAGNOSIS — R06.09 DOE (DYSPNEA ON EXERTION): ICD-10-CM

## 2024-01-25 DIAGNOSIS — I25.10 CORONARY ARTERY CALCIFICATION OF NATIVE ARTERY: Chronic | ICD-10-CM

## 2024-01-25 LAB
BH CV STRESS BP STAGE 1: NORMAL
BH CV STRESS BP STAGE 2: NORMAL
BH CV STRESS DURATION MIN STAGE 1: 3
BH CV STRESS DURATION MIN STAGE 2: 3
BH CV STRESS DURATION MIN STAGE 3: 1
BH CV STRESS DURATION SEC STAGE 1: 0
BH CV STRESS DURATION SEC STAGE 2: 0
BH CV STRESS DURATION SEC STAGE 3: 30
BH CV STRESS GRADE STAGE 1: 10
BH CV STRESS GRADE STAGE 2: 12
BH CV STRESS GRADE STAGE 3: 14
BH CV STRESS HR STAGE 1: 109
BH CV STRESS HR STAGE 2: 128
BH CV STRESS HR STAGE 3: 135
BH CV STRESS METS STAGE 1: 5
BH CV STRESS METS STAGE 2: 7.5
BH CV STRESS METS STAGE 3: 9
BH CV STRESS O2 STAGE 2: 94
BH CV STRESS PROTOCOL 1: NORMAL
BH CV STRESS RECOVERY BP: NORMAL MMHG
BH CV STRESS RECOVERY HR: 103 BPM
BH CV STRESS SPEED STAGE 1: 1.7
BH CV STRESS SPEED STAGE 2: 2.5
BH CV STRESS SPEED STAGE 3: 3.4
BH CV STRESS STAGE 1: 1
BH CV STRESS STAGE 2: 2
BH CV STRESS STAGE 3: 3
MAXIMAL PREDICTED HEART RATE: 159 BPM
PERCENT MAX PREDICTED HR: 84.91 %
STRESS BASELINE BP: NORMAL MMHG
STRESS BASELINE HR: 102 BPM
STRESS O2 SAT REST: 97 %
STRESS PERCENT HR: 100 %
STRESS POST ESTIMATED WORKLOAD: 9 METS
STRESS POST EXERCISE DUR MIN: 7 MIN
STRESS POST EXERCISE DUR SEC: 30 SEC
STRESS POST O2 SAT PEAK: 94 %
STRESS POST PEAK BP: NORMAL MMHG
STRESS POST PEAK HR: 135 BPM
STRESS TARGET HR: 135 BPM

## 2024-01-25 PROCEDURE — 93017 CV STRESS TEST TRACING ONLY: CPT

## 2024-03-04 ENCOUNTER — OFFICE VISIT (OUTPATIENT)
Dept: INTERNAL MEDICINE | Facility: CLINIC | Age: 64
End: 2024-03-04
Payer: COMMERCIAL

## 2024-03-04 VITALS
RESPIRATION RATE: 18 BRPM | BODY MASS INDEX: 22.4 KG/M2 | WEIGHT: 160 LBS | HEART RATE: 98 BPM | OXYGEN SATURATION: 92 % | DIASTOLIC BLOOD PRESSURE: 70 MMHG | SYSTOLIC BLOOD PRESSURE: 110 MMHG | HEIGHT: 71 IN | TEMPERATURE: 96.5 F

## 2024-03-04 DIAGNOSIS — R68.89 CONGESTION OF THROAT: ICD-10-CM

## 2024-03-04 DIAGNOSIS — J10.1 INFLUENZA B: Primary | ICD-10-CM

## 2024-03-04 LAB
EXPIRATION DATE: ABNORMAL
FLUAV AG UPPER RESP QL IA.RAPID: NOT DETECTED
FLUBV AG UPPER RESP QL IA.RAPID: DETECTED
INTERNAL CONTROL: ABNORMAL
Lab: ABNORMAL
SARS-COV-2 AG UPPER RESP QL IA.RAPID: NOT DETECTED

## 2024-03-04 RX ORDER — BROMPHENIRAMINE MALEATE, PSEUDOEPHEDRINE HYDROCHLORIDE, AND DEXTROMETHORPHAN HYDROBROMIDE 2; 30; 10 MG/5ML; MG/5ML; MG/5ML
5 SYRUP ORAL 4 TIMES DAILY PRN
Qty: 150 ML | Refills: 0 | Status: SHIPPED | OUTPATIENT
Start: 2024-03-04 | End: 2024-03-08

## 2024-03-04 RX ORDER — OSELTAMIVIR PHOSPHATE 75 MG/1
75 CAPSULE ORAL 2 TIMES DAILY
Qty: 10 CAPSULE | Refills: 0 | Status: SHIPPED | OUTPATIENT
Start: 2024-03-04 | End: 2024-03-08

## 2024-03-07 ENCOUNTER — TELEPHONE (OUTPATIENT)
Dept: INTERNAL MEDICINE | Facility: CLINIC | Age: 64
End: 2024-03-07

## 2024-03-07 NOTE — TELEPHONE ENCOUNTER
He needs to see dr. Villasenor for an appointment. Or see YSABEL White for an appointment. He is a patient of Dr. Villasenor. He will need to be treated in office. Or he can go to urgent care.

## 2024-03-07 NOTE — TELEPHONE ENCOUNTER
Caller: Hammad Justice    Relationship: Self    Best call back number: [phone]    What medication are you requesting: Z PACK     What are your current symptoms: COUGHING UP GREEN PHLEGM , DIAGNOSED MONDAY 03/04/2024 WITH FLU     If a prescription is needed, what is your preferred pharmacy and phone number: [facility] [id number] - [place], [place] - [address] - [phone]  - [phone]     Additional notes: PATIENT IS REQUESTING A CALL BACK TO LET HIM KNOW IF HE CAN BE PRESCRIBED THE MEDICATION.

## 2024-03-08 ENCOUNTER — OFFICE VISIT (OUTPATIENT)
Dept: INTERNAL MEDICINE | Facility: CLINIC | Age: 64
End: 2024-03-08
Payer: COMMERCIAL

## 2024-03-08 VITALS
TEMPERATURE: 96.9 F | SYSTOLIC BLOOD PRESSURE: 112 MMHG | WEIGHT: 160 LBS | HEIGHT: 71 IN | DIASTOLIC BLOOD PRESSURE: 66 MMHG | OXYGEN SATURATION: 94 % | HEART RATE: 92 BPM | BODY MASS INDEX: 22.4 KG/M2 | RESPIRATION RATE: 16 BRPM

## 2024-03-08 DIAGNOSIS — F17.219 CIGARETTE NICOTINE DEPENDENCE WITH NICOTINE-INDUCED DISORDER: Chronic | ICD-10-CM

## 2024-03-08 DIAGNOSIS — J20.9 ACUTE BRONCHITIS WITH BRONCHOSPASM: Primary | ICD-10-CM

## 2024-03-08 DIAGNOSIS — J10.1 INFLUENZA B: ICD-10-CM

## 2024-03-08 DIAGNOSIS — J41.1 MUCOPURULENT CHRONIC BRONCHITIS: Chronic | ICD-10-CM

## 2024-03-08 PROCEDURE — 99214 OFFICE O/P EST MOD 30 MIN: CPT | Performed by: INTERNAL MEDICINE

## 2024-03-08 RX ORDER — CEFDINIR 300 MG/1
CAPSULE ORAL
Qty: 20 CAPSULE | Refills: 0 | Status: SHIPPED | OUTPATIENT
Start: 2024-03-08

## 2024-03-08 RX ORDER — HYDROCODONE POLISTIREX AND CHLORPHENIRAMINE POLISTIREX 10; 8 MG/5ML; MG/5ML
SUSPENSION, EXTENDED RELEASE ORAL
Qty: 120 ML | Refills: 0 | Status: SHIPPED | OUTPATIENT
Start: 2024-03-08

## 2024-03-08 RX ORDER — PREDNISONE 10 MG/1
TABLET ORAL
Qty: 56 TABLET | Refills: 0 | Status: SHIPPED | OUTPATIENT
Start: 2024-03-08

## 2024-03-08 RX ORDER — ALBUTEROL SULFATE 90 UG/1
AEROSOL, METERED RESPIRATORY (INHALATION)
Qty: 18 G | Refills: 6 | Status: SHIPPED | OUTPATIENT
Start: 2024-03-08

## 2024-03-08 NOTE — PROGRESS NOTES
03/08/2024    Patient Information  Hammad Justice                                                                                          [address]  [facility] [address]      1960  [unfilled]  There is no work phone number on file.    Chief Complaint:     Complaining of productive cough and wheezing.  Recent influenza diagnosis.    History of Present Illness:    Patient with COPD saw one of my colleagues this Monday with complaints of diffuse aches and pains and dizziness.  He returned positive for Influenza B.  He was treated with Tamiflu and his last dose is coming up today.  However, patient has now developed cough and wheezing.  The cough is productive of yellow phlegm.  He is more short of breath than usual.  He has had the chills but no documented fever.  Past medical history reviewed and updated were necessary.    Review of Systems   Constitutional: Positive for malaise/fatigue. Negative for chills and fever.   Cardiovascular:  Positive for dyspnea on exertion.   Respiratory:  Positive for cough, shortness of breath, sputum production and wheezing. Negative for hemoptysis.    Musculoskeletal:  Positive for myalgias.       Active Problems:    Patient Active Problem List   Diagnosis    Chronic obstructive pulmonary disease (COPD)    History of colon polyps, 7/28/2020--tubular adenoma x 2.  Hyperplastic x 3.  09/12/2013--hyperplastic ×1.    Generalized osteoarthritis of multiple sites    Hyperlipidemia    Cigarette nicotine dependence with nicotine-induced disorder    Vitamin D deficiency    Therapeutic drug monitoring    Routine physical examination    Polycythemia secondary to smoking    Benign prostatic hyperplasia with weak urinary stream    Impaired fasting glucose    Pulmonary nodule, 12/16/2021--small left upper lobe micronodule.  Possible mucoid impaction right middle lobe.    Coronary artery calcification of native artery,November 8, 2023--coronary calcium score 2023.4    Heavy  cigarette smoker    Influenza B    Acute bronchitis with bronchospasm         Past Medical History:   Diagnosis Date    Benign prostatic hyperplasia with weak urinary stream 03/20/2019 March 20, 2019--patient presents with a progressively worsening decreased urine flow over the past year or so.  He reports he just takes a long time for him to initiate and complete urination.  He has no pain or dysuria.  No blood noted in his urine.  Urinalysis today reveals 31-50 WBCs but there are 3-6 epithelial cells present as well as 1+ calcium oxalate crystals.  No bacteria seen.  No red bl    Calcium oxalate crystals present in urine 03/20/2019 March 20, 2019--patient presents with a progressively worsening decreased urine flow over the past year or so.  He reports he just takes a long time for him to initiate and complete urination.  He has no pain or dysuria.  No blood noted in his urine.  Urinalysis today reveals 31-50 WBCs but there are 3-6 epithelial cells present as well as 1+ calcium oxalate crystals.  No bacteria seen.  No red bl    Chronic obstructive pulmonary disease (COPD) 08/12/2013 08/12/2013--pulmonary function tests revealed FVC 3.35, 66% of predicted. Post bronchodilator FVC 3.31. FEV1 1.77, 45% of predicted. 1.8 to post bronchodilator. FEV1/FVC 53, Total lung capacity is 105% of predicted. Normal diffusion. Moderately severe obstructive pattern with no reversibility.   08/12/2013--pulmonary consult. Pulmonary function tests revealed moderate to severe obstructive pattern    Cigarette nicotine dependence with nicotine-induced disorder 06/14/2016    COPD (chronic obstructive pulmonary disease) 08/12/2013 08/12/2013--pulmonary function tests revealed FVC 3.35, 66% of predicted. Post bronchodilator FVC 3.31. FEV1 1.77, 45% of predicted. 1.8 to post bronchodilator. FEV1/FVC 53, Total lung capacity is 105% of predicted. Normal diffusion. Moderately severe obstructive pattern with no reversibility.    08/12/2013--pulmonary consult. Pulmonary function tests revealed moderate to severe obstructive pattern. Diagnosis COPD. Chronic bronchitis type. Patient was started on Ventolin when necessary and Spiriva HandiHaler use once daily.    Coronary artery calcification of native artery,November 8, 2023--coronary calcium score 2023.4 11/01/2023 November 8, 2023--coronary calcium score 2023.4     December 16, 2021--low-dose chest CT revealed solitary left upper lobe 3 mm peripheral micronodule.  Focal areas of pleural-based thickening seen in the right middle lobe with an adjacent linear area of opacity likely to be mucoid impaction measuring 6 mm in length and less than 2 mm in width.  No other focal lung opacity seen.  Mild hyperexpansi    Generalized osteoarthritis of multiple sites 06/14/2016    Heavy cigarette smoker 11/01/2023    History of Benign essential hypertension 04/04/2012 04/04/2012--blood pressure remains low and patient had to discontinue lisinopril because of dizziness.   03/14/2012--blood pressure too low at 102/64. Lisinopril HCT decreased to 10/12.5.   02/22/2012--blood pressure elevated at 140/90. Lisinopril HCT 20/12.5 daily initiated.    History of colon polyps, 7/28/2020--tubular adenoma x 2.  Hyperplastic x 3.  09/12/2013--hyperplastic ×1. 09/12/2013 July 28, 2020--colonoscopy revealed a 1 mm polyp in the rectum.  #4, 1 to 3 mm polyps in the rectosigmoid colon.  #1, 1 mm polyp in the rectum.  Polyps were removed.  Pathology returned tubular adenoma in the proximal ascending and sigmoid colon.  Hyperplastic x3.  09/12/2013--colonoscopy revealed a 3-4 mm polyp in the sigmoid colon which was biopsied and removed. Otherwise, normal colonoscopy to     History of pulmonary function tests 08/12/2013 08/12/2013--pulmonary function tests revealed FVC 3.35, 66% of predicted. Post bronchodilator FVC 3.31. FEV1 1.77, 45% of predicted. 1.8 to post bronchodilator. FEV1/FVC 53, Total lung capacity is  105% of predicted. Normal diffusion. Moderately severe obstructive pattern with no reversibility.    Hyperlipidemia 06/14/2016    Impaired fasting glucose 03/20/2019    Polycythemia secondary to smoking 07/07/2016 07/07/2016--routine physical exam.  CBC reveals hemoglobin elevated at 17.9 and hematocrit elevated at 53.5.  Patient has a somewhat heavy smoker and I feel this is the etiology of this and will not pursue hematologic workup, at least at the present time.  Patient is in the process of trying to discontinue cigarettes.  Aspirin 81 mg per day initiated.    Pulmonary nodule, 12/16/2021--small left upper lobe micronodule.  Possible mucoid impaction right middle lobe. 11/01/2023 December 16, 2021--low-dose chest CT revealed solitary left upper lobe 3 mm peripheral micronodule.  Focal areas of pleural-based thickening seen in the right middle lobe with an adjacent linear area of opacity likely to be mucoid impaction measuring 6 mm in length and less than 2 mm in width.  No other focal lung opacity seen.  Mild hyperexpansion suggestive of COPD.  Moderate coronary artery desmond    Vitamin D deficiency 06/14/2016         Past Surgical History:   Procedure Laterality Date    COLONOSCOPY  09/12/2013 09/12/2013--colonoscopy revealed a 3-4 mm polyp in the sigmoid colon which was biopsied and removed. Otherwise, normal colonoscopy to cecum with an excellent prep. Pathology returned hyperplastic.    COLONOSCOPY N/A 7/28/2020 July 28, 2020--colonoscopy revealed a 1 mm polyp in the rectum.  #4, 1 to 3 mm polyps in the rectosigmoid colon.  #1, 1 mm polyp in the rectum.  Polyps were removed.  Pathology returned tubular adenoma in the proximal ascending and sigmoid colon.  Hyperplastic x3.    KNEE ARTHROSCOPY Right 33 years of age    Age 33--right meniscus surgery.         No Known Allergies        Current Outpatient Medications:     albuterol sulfate  (90 Base) MCG/ACT inhaler, Inhale 2 puffs every 4-6 hours  as needed for wheezing and shortness of breath., Disp: 18 g, Rfl: 6    aspirin 81 MG EC tablet, Take 1 p.o. 3 days a week on Monday, Wednesday, and Friday for heart protection, Disp: , Rfl:     atorvastatin (LIPITOR) 40 MG tablet, Take 1 tablet by mouth Daily., Disp: , Rfl:     Umeclidinium-Vilanterol (Anoro Ellipta) 62.5-25 MCG/ACT aerosol powder  inhaler, INHALE 1 DOSE BY MOUTH DAILY, Disp: 180 each, Rfl: 1    cefdinir (OMNICEF) 300 MG capsule, Take 1 p.o. twice daily until gone for bronchitis, Disp: 20 capsule, Rfl: 0    Hydrocod Rene-Chlorphe Rene ER (TUSSIONEX PENNKINETIC) 10-8 MG/5ML ER suspension, Take 1 teaspoon p.o. twice daily 12 hours apart as needed for cough/and/or congestion from influenza and bronchitis, Disp: 120 mL, Rfl: 0    predniSONE (DELTASONE) 10 MG tablet, 5 by mouth daily 7 days, then 4 daily 2 days, 3 daily 2 days, 2 daily 2 days, 1 daily 2 days, one half daily 2 days and discontinue., Disp: 56 tablet, Rfl: 0      Family History   Problem Relation Age of Onset    Cancer Mother     Heart attack Father     Anuerysm Brother         Cerebral Artery Aneurysm. Brother  of a brain aneurysm at age 52.    Hypertension Other         Benign Essential    Heart disease Other     Malig Hyperthermia Neg Hx          Social History     Socioeconomic History    Marital status:     Number of children: 2    Highest education level: 8th grade   Tobacco Use    Smoking status: Former     Current packs/day: 0.00     Average packs/day: 2.0 packs/day for 5.0 years (10.0 ttl pk-yrs)     Types: Cigarettes     Start date: 2018     Quit date: 2023     Years since quittin.1    Smokeless tobacco: Never    Tobacco comments:     Caffeine use    Vaping Use    Vaping status: Never Used   Substance and Sexual Activity    Alcohol use: Yes     Comment: 6 BEERS A DAY     Drug use: No    Sexual activity: Yes     Partners: Female         Vitals:    24 1018   BP: 112/66   Pulse: 92   Resp: 16   Temp: 96.9  "°F (36.1 °C)   TempSrc: Temporal   SpO2: 94%   Weight: 72.6 kg (160 lb)   Height: 180.3 cm (70.98\")        Body mass index is 22.33 kg/m².      Physical Exam:    General: Alert and oriented x 3.  No acute distress.  Normal affect.  HEENT: Pupils equal, round, reactive to light; extraocular movements intact; sclerae nonicteric; pharynx, ear canals and TMs normal.  Neck: Without JVD, thyromegaly, bruit, or adenopathy.  Lungs: There are bilateral scattered wheezes and rhonchi but no definite signs of consolidation.  Heart: Regular rate and rhythm without murmur, rub, gallop, or click.  Abdomen: Soft, nontender, without hepatosplenomegaly or hernia.  Bowel sounds normal.  : Deferred.  Rectal: Deferred.  Extremities: Without clubbing, cyanosis, edema, or pulse deficit.  Neurologic: Intact without focal deficit.  Normal station and gait observed during ingress and egress from the examination room.  Skin: Without significant lesion.  Musculoskeletal: Unremarkable.    Lab/other results:      Assessment/Plan:     Diagnosis Plan   1. Acute bronchitis with bronchospasm  predniSONE (DELTASONE) 10 MG tablet    cefdinir (OMNICEF) 300 MG capsule    Hydrocod Rene-Chlorphe Rene ER (TUSSIONEX PENNKINETIC) 10-8 MG/5ML ER suspension      2. Chronic obstructive pulmonary disease (COPD)  albuterol sulfate  (90 Base) MCG/ACT inhaler    predniSONE (DELTASONE) 10 MG tablet    cefdinir (OMNICEF) 300 MG capsule    Hydrocod Rene-Chlorphe Rene ER (TUSSIONEX PENNKINETIC) 10-8 MG/5ML ER suspension      3. Influenza B  predniSONE (DELTASONE) 10 MG tablet    Hydrocod Rene-Chlorphe Rene ER (TUSSIONEX PENNKINETIC) 10-8 MG/5ML ER suspension      4. Cigarette nicotine dependence with nicotine-induced disorder            Patient with COPD who is a smoker but has cut back on his cigarette consumption quite a bit.  He was doing stable as far as his lungs are concerned up until here recently when he was diagnosed with Influenza B.  This was " treated with Tamiflu and cough syrup which really did not help.  Patient now has diffuse bronchospasm and productive cough and we need to be aggressive with treatment.    Plan is as follows: Prednisone 50 mg p.o. daily x 7 days, taper and discontinue.  Cefdinir 300 mg p.o. twice daily x 10 days.  Tussionex cough syrup 1 teaspoon twice daily as needed for cough and/or congestion.  Patient needs to contact me if his symptoms do not improve within the next few days and certainly if they do not resolve with treatment.      Procedures

## 2024-03-10 NOTE — PROGRESS NOTES
"Chief Complaint  Dizziness, Nasal Congestion, and Chills    Subjective          Hammad Justice presents to [facility]  History of Present Illness  The patient is a 63-year-old male who is here due to some dizziness, nasal congestion, and some chills.    He flew to [place] on Thursday and went to a wedding and drank a lot of beer on Thursday and Friday. He woke up on Saturday lightheaded. Usually, when he is lightheaded, he gets a fever, but he denies any fevers. He drank a lot of beer and water. He was told he was dehydrated or had low blood pressure. He drank a gallon of water yesterday and stayed in bed. He denies any trouble breathing currently, but he does have COPD. He is on Anoro and albuterol inhalers. He sees Dr. Villasenor. He has a mild cough.    Objective   Vital Signs:   /70 (BP Location: Left arm, Patient Position: Lying)   Pulse 98   Temp 96.5 °F (35.8 °C)   Resp 18   Ht 180.3 cm (70.98\")   Wt 72.6 kg (160 lb)   SpO2 92%   BMI 22.33 kg/m²     Physical Exam  Vitals and nursing note reviewed.   Constitutional:       Appearance: He is well-developed.   HENT:      Head: Normocephalic and atraumatic.   Cardiovascular:      Rate and Rhythm: Normal rate and regular rhythm.      Heart sounds: Normal heart sounds.   Pulmonary:      Effort: Pulmonary effort is normal. No respiratory distress.      Breath sounds: Normal breath sounds.   Musculoskeletal:      Cervical back: Normal range of motion and neck supple.   Neurological:      Mental Status: He is alert and oriented to person, place, and time.   Psychiatric:         Behavior: Behavior normal.         Physical Exam       Result Review :                 Assessment and Plan    Diagnoses and all orders for this visit:    1. Influenza B (Primary)  -     Discontinue: oseltamivir (Tamiflu) 75 MG capsule; Take 1 capsule by mouth 2 (Two) Times a Day for 5 days.  Dispense: 10 capsule; Refill: 0  -     Discontinue: " brompheniramine-pseudoephedrine-DM 30-2-10 MG/5ML syrup; Take 5 mL by mouth 4 (Four) Times a Day As Needed for Allergies.  Dispense: 150 mL; Refill: 0    2. Congestion of throat  -     POCT SARS-CoV-2 Antigen ANA + Flu      Assessment & Plan  1. Influenza B.  He tested positive for influenza B. He was advised to continue to stay hydrated. I will start him on Tamiflu. He was advised to do his Anoro and albuterol regularly. I will send in a prescription for Bromfed. He was advised to take Tylenol or Motrin for fevers. He was advised to stay away from other people and wear a mask. If his blood pressure gets worse, he was advised to go to the ER.    Follow-up  He will follow up as needed.    Follow Up   No follow-ups on file.  Patient was given instructions and counseling regarding his condition or for health maintenance advice. Please see specific information pulled into the AVS if appropriate.       Patient or patient representative verbalized consent for the use of Ambient Listening during the visit with  Gordon Leonardo MD for chart documentation. 3/10/2024  11:18 EDT

## 2024-03-14 ENCOUNTER — LAB (OUTPATIENT)
Dept: LAB | Facility: HOSPITAL | Age: 64
End: 2024-03-14
Payer: COMMERCIAL

## 2024-03-14 DIAGNOSIS — E78.2 MIXED HYPERLIPIDEMIA: Chronic | ICD-10-CM

## 2024-03-14 DIAGNOSIS — D75.1 POLYCYTHEMIA SECONDARY TO SMOKING: Chronic | ICD-10-CM

## 2024-03-14 DIAGNOSIS — R73.01 IMPAIRED FASTING GLUCOSE: Chronic | ICD-10-CM

## 2024-03-14 DIAGNOSIS — Z86.16 HISTORY OF 2019 NOVEL CORONAVIRUS DISEASE (COVID-19): ICD-10-CM

## 2024-03-14 LAB
ALBUMIN SERPL-MCNC: 3.9 G/DL (ref 3.5–5.2)
ALBUMIN/GLOB SERPL: 2 G/DL
ALP SERPL-CCNC: 67 U/L (ref 39–117)
ALT SERPL W P-5'-P-CCNC: 36 U/L (ref 1–41)
ANION GAP SERPL CALCULATED.3IONS-SCNC: 11 MMOL/L (ref 5–15)
AST SERPL-CCNC: 20 U/L (ref 1–40)
BILIRUB SERPL-MCNC: 0.4 MG/DL (ref 0–1.2)
BUN SERPL-MCNC: 14 MG/DL (ref 8–23)
BUN/CREAT SERPL: 16.3 (ref 7–25)
CALCIUM SPEC-SCNC: 8.6 MG/DL (ref 8.6–10.5)
CHLORIDE SERPL-SCNC: 102 MMOL/L (ref 98–107)
CO2 SERPL-SCNC: 28 MMOL/L (ref 22–29)
CREAT SERPL-MCNC: 0.86 MG/DL (ref 0.76–1.27)
DEPRECATED RDW RBC AUTO: 43.4 FL (ref 37–54)
EGFRCR SERPLBLD CKD-EPI 2021: 98.5 ML/MIN/1.73
ERYTHROCYTE [DISTWIDTH] IN BLOOD BY AUTOMATED COUNT: 12.6 % (ref 12.3–15.4)
GLOBULIN UR ELPH-MCNC: 2 GM/DL
GLUCOSE SERPL-MCNC: 77 MG/DL (ref 65–99)
HBA1C MFR BLD: 6 % (ref 4.8–5.6)
HCT VFR BLD AUTO: 45.2 % (ref 37.5–51)
HGB BLD-MCNC: 15.5 G/DL (ref 13–17.7)
MCH RBC QN AUTO: 32.4 PG (ref 26.6–33)
MCHC RBC AUTO-ENTMCNC: 34.3 G/DL (ref 31.5–35.7)
MCV RBC AUTO: 94.4 FL (ref 79–97)
PLATELET # BLD AUTO: 315 10*3/MM3 (ref 140–450)
PMV BLD AUTO: 10.5 FL (ref 6–12)
POTASSIUM SERPL-SCNC: 3.6 MMOL/L (ref 3.5–5.2)
PROT SERPL-MCNC: 5.9 G/DL (ref 6–8.5)
RBC # BLD AUTO: 4.79 10*6/MM3 (ref 4.14–5.8)
SODIUM SERPL-SCNC: 141 MMOL/L (ref 136–145)
T3FREE SERPL-MCNC: 3.21 PG/ML (ref 2–4.4)
T4 FREE SERPL-MCNC: 1.49 NG/DL (ref 0.93–1.7)
TSH SERPL DL<=0.05 MIU/L-ACNC: 0.35 UIU/ML (ref 0.27–4.2)
WBC NRBC COR # BLD AUTO: 8.49 10*3/MM3 (ref 3.4–10.8)

## 2024-03-14 PROCEDURE — 36415 COLL VENOUS BLD VENIPUNCTURE: CPT

## 2024-03-14 PROCEDURE — 86769 SARS-COV-2 COVID-19 ANTIBODY: CPT | Performed by: INTERNAL MEDICINE

## 2024-03-14 PROCEDURE — 83036 HEMOGLOBIN GLYCOSYLATED A1C: CPT | Performed by: INTERNAL MEDICINE

## 2024-03-14 PROCEDURE — 83704 LIPOPROTEIN BLD QUAN PART: CPT | Performed by: INTERNAL MEDICINE

## 2024-03-14 PROCEDURE — 84481 FREE ASSAY (FT-3): CPT | Performed by: INTERNAL MEDICINE

## 2024-03-14 PROCEDURE — 80050 GENERAL HEALTH PANEL: CPT | Performed by: INTERNAL MEDICINE

## 2024-03-14 PROCEDURE — 84439 ASSAY OF FREE THYROXINE: CPT | Performed by: INTERNAL MEDICINE

## 2024-03-14 PROCEDURE — 80061 LIPID PANEL: CPT | Performed by: INTERNAL MEDICINE

## 2024-03-15 LAB — SARS-COV-2 AB SERPL QL IA: POSITIVE

## 2024-03-17 LAB
CHOLEST SERPL-MCNC: 145 MG/DL (ref 100–199)
HDL SERPL-SCNC: 34 UMOL/L
HDLC SERPL-MCNC: 62 MG/DL
LDL SERPL QN: 20.4 NM
LDL SERPL-SCNC: 782 NMOL/L
LDL SMALL SERPL-SCNC: 299 NMOL/L
LDLC SERPL CALC-MCNC: 60 MG/DL (ref 0–99)
TRIGL SERPL-MCNC: 133 MG/DL (ref 0–149)

## 2024-03-21 ENCOUNTER — OFFICE VISIT (OUTPATIENT)
Dept: INTERNAL MEDICINE | Facility: CLINIC | Age: 64
End: 2024-03-21
Payer: COMMERCIAL

## 2024-03-21 VITALS
HEART RATE: 95 BPM | SYSTOLIC BLOOD PRESSURE: 100 MMHG | WEIGHT: 168 LBS | OXYGEN SATURATION: 90 % | BODY MASS INDEX: 23.52 KG/M2 | DIASTOLIC BLOOD PRESSURE: 62 MMHG | HEIGHT: 71 IN | RESPIRATION RATE: 14 BRPM

## 2024-03-21 DIAGNOSIS — Z00.00 ROUTINE PHYSICAL EXAMINATION: ICD-10-CM

## 2024-03-21 DIAGNOSIS — Z86.010 HISTORY OF COLON POLYPS: Chronic | ICD-10-CM

## 2024-03-21 DIAGNOSIS — R73.01 IMPAIRED FASTING GLUCOSE: Primary | Chronic | ICD-10-CM

## 2024-03-21 DIAGNOSIS — F17.219 CIGARETTE NICOTINE DEPENDENCE WITH NICOTINE-INDUCED DISORDER: Chronic | ICD-10-CM

## 2024-03-21 DIAGNOSIS — E55.9 VITAMIN D DEFICIENCY: Chronic | ICD-10-CM

## 2024-03-21 DIAGNOSIS — J10.1 INFLUENZA B: ICD-10-CM

## 2024-03-21 DIAGNOSIS — J41.1 MUCOPURULENT CHRONIC BRONCHITIS: Chronic | ICD-10-CM

## 2024-03-21 DIAGNOSIS — D75.1 POLYCYTHEMIA SECONDARY TO SMOKING: Chronic | ICD-10-CM

## 2024-03-21 DIAGNOSIS — Z51.81 THERAPEUTIC DRUG MONITORING: ICD-10-CM

## 2024-03-21 DIAGNOSIS — E78.2 MIXED HYPERLIPIDEMIA: Chronic | ICD-10-CM

## 2024-03-21 DIAGNOSIS — R39.12 BENIGN PROSTATIC HYPERPLASIA WITH WEAK URINARY STREAM: Chronic | ICD-10-CM

## 2024-03-21 DIAGNOSIS — N40.1 BENIGN PROSTATIC HYPERPLASIA WITH WEAK URINARY STREAM: Chronic | ICD-10-CM

## 2024-03-21 DIAGNOSIS — I25.84 CORONARY ARTERY CALCIFICATION OF NATIVE ARTERY: Chronic | ICD-10-CM

## 2024-03-21 DIAGNOSIS — F17.210 HEAVY CIGARETTE SMOKER: Chronic | ICD-10-CM

## 2024-03-21 DIAGNOSIS — J20.9 ACUTE BRONCHITIS WITH BRONCHOSPASM: ICD-10-CM

## 2024-03-21 DIAGNOSIS — M15.9 GENERALIZED OSTEOARTHRITIS OF MULTIPLE SITES: Chronic | ICD-10-CM

## 2024-03-21 DIAGNOSIS — I25.10 CORONARY ARTERY CALCIFICATION OF NATIVE ARTERY: Chronic | ICD-10-CM

## 2024-03-21 DIAGNOSIS — R91.1 PULMONARY NODULE: Chronic | ICD-10-CM

## 2024-03-21 DIAGNOSIS — Z86.16 HISTORY OF 2019 NOVEL CORONAVIRUS DISEASE (COVID-19): ICD-10-CM

## 2024-03-21 RX ORDER — ATORVASTATIN CALCIUM 40 MG/1
TABLET, FILM COATED ORAL
Qty: 90 TABLET | Refills: 3 | Status: SHIPPED | OUTPATIENT
Start: 2024-03-21

## 2024-03-21 RX ORDER — UMECLIDINIUM BROMIDE AND VILANTEROL TRIFENATATE 62.5; 25 UG/1; UG/1
POWDER RESPIRATORY (INHALATION)
Qty: 180 EACH | Refills: 3 | Status: SHIPPED | OUTPATIENT
Start: 2024-03-21

## 2024-03-21 NOTE — PROGRESS NOTES
03/21/2024    Patient Information  Hammad Justice                                                                                          [address]  [facility] [address]      1960  [unfilled]  There is no work phone number on file.    Chief Complaint:     Follow-up medical problems as noted below.  Recent influenza and acute bronchitis with bronchospasm/exacerbation of COPD.    History of Present Illness:    Patient with a history of medical problems as noted below in assessment and plan presents today for follow-up with labs prior in order to monitor his chronic medical issues.  I evaluated the patient earlier this month for an exacerbation of COPD and placed him on prednisone and Cefdinir.  A couple days later he saw my partner and was diagnosed with Influenza B and treated with Tamiflu for 5 days.  He reports he is feeling much better.    Review of Systems   Constitutional: Negative. Negative for chills and fever.   HENT: Negative.     Eyes: Negative.    Cardiovascular:  Positive for dyspnea on exertion.   Respiratory:  Positive for cough and sputum production. Negative for shortness of breath and wheezing.    Endocrine: Negative.    Hematologic/Lymphatic: Negative.    Skin: Negative.    Musculoskeletal: Negative.    Gastrointestinal: Negative.    Genitourinary: Negative.    Neurological: Negative.    Psychiatric/Behavioral: Negative.     Allergic/Immunologic: Negative.        Active Problems:    Patient Active Problem List   Diagnosis    Chronic obstructive pulmonary disease (COPD)    History of colon polyps, 7/28/2020--tubular adenoma x 2.  Hyperplastic x 3.  09/12/2013--hyperplastic ×1.    Generalized osteoarthritis of multiple sites    Hyperlipidemia    Cigarette nicotine dependence with nicotine-induced disorder    Vitamin D deficiency    Therapeutic drug monitoring    Routine physical examination    Polycythemia secondary to smoking    Benign prostatic hyperplasia with weak urinary  stream    Impaired fasting glucose    Pulmonary nodule, 12/16/2021--small left upper lobe micronodule.  Possible mucoid impaction right middle lobe.    Coronary artery calcification of native artery,November 8, 2023--coronary calcium score 2023.4    Heavy cigarette smoker    History of 2019 novel coronavirus disease (COVID-19)         Past Medical History:   Diagnosis Date    Benign prostatic hyperplasia with weak urinary stream 03/20/2019 March 20, 2019--patient presents with a progressively worsening decreased urine flow over the past year or so.  He reports he just takes a long time for him to initiate and complete urination.  He has no pain or dysuria.  No blood noted in his urine.  Urinalysis today reveals 31-50 WBCs but there are 3-6 epithelial cells present as well as 1+ calcium oxalate crystals.  No bacteria seen.  No red bl    Calcium oxalate crystals present in urine 03/20/2019 March 20, 2019--patient presents with a progressively worsening decreased urine flow over the past year or so.  He reports he just takes a long time for him to initiate and complete urination.  He has no pain or dysuria.  No blood noted in his urine.  Urinalysis today reveals 31-50 WBCs but there are 3-6 epithelial cells present as well as 1+ calcium oxalate crystals.  No bacteria seen.  No red bl    Chronic obstructive pulmonary disease (COPD) 08/12/2013 08/12/2013--pulmonary function tests revealed FVC 3.35, 66% of predicted. Post bronchodilator FVC 3.31. FEV1 1.77, 45% of predicted. 1.8 to post bronchodilator. FEV1/FVC 53, Total lung capacity is 105% of predicted. Normal diffusion. Moderately severe obstructive pattern with no reversibility.   08/12/2013--pulmonary consult. Pulmonary function tests revealed moderate to severe obstructive pattern    Cigarette nicotine dependence with nicotine-induced disorder 06/14/2016    COPD (chronic obstructive pulmonary disease) 08/12/2013 08/12/2013--pulmonary function tests  revealed FVC 3.35, 66% of predicted. Post bronchodilator FVC 3.31. FEV1 1.77, 45% of predicted. 1.8 to post bronchodilator. FEV1/FVC 53, Total lung capacity is 105% of predicted. Normal diffusion. Moderately severe obstructive pattern with no reversibility.   08/12/2013--pulmonary consult. Pulmonary function tests revealed moderate to severe obstructive pattern. Diagnosis COPD. Chronic bronchitis type. Patient was started on Ventolin when necessary and Spiriva HandiHaler use once daily.    Coronary artery calcification of native artery,November 8, 2023--coronary calcium score 2023.4 11/01/2023 November 8, 2023--coronary calcium score 2023.4     December 16, 2021--low-dose chest CT revealed solitary left upper lobe 3 mm peripheral micronodule.  Focal areas of pleural-based thickening seen in the right middle lobe with an adjacent linear area of opacity likely to be mucoid impaction measuring 6 mm in length and less than 2 mm in width.  No other focal lung opacity seen.  Mild hyperexpansi    Generalized osteoarthritis of multiple sites 06/14/2016    Heavy cigarette smoker 11/01/2023    History of 2019 novel coronavirus disease (COVID-19) 03/21/2024    History of Benign essential hypertension 04/04/2012 04/04/2012--blood pressure remains low and patient had to discontinue lisinopril because of dizziness.   03/14/2012--blood pressure too low at 102/64. Lisinopril HCT decreased to 10/12.5.   02/22/2012--blood pressure elevated at 140/90. Lisinopril HCT 20/12.5 daily initiated.    History of colon polyps, 7/28/2020--tubular adenoma x 2.  Hyperplastic x 3.  09/12/2013--hyperplastic ×1. 09/12/2013 July 28, 2020--colonoscopy revealed a 1 mm polyp in the rectum.  #4, 1 to 3 mm polyps in the rectosigmoid colon.  #1, 1 mm polyp in the rectum.  Polyps were removed.  Pathology returned tubular adenoma in the proximal ascending and sigmoid colon.  Hyperplastic x3.  09/12/2013--colonoscopy revealed a 3-4 mm polyp in the  sigmoid colon which was biopsied and removed. Otherwise, normal colonoscopy to     History of pulmonary function tests 08/12/2013 08/12/2013--pulmonary function tests revealed FVC 3.35, 66% of predicted. Post bronchodilator FVC 3.31. FEV1 1.77, 45% of predicted. 1.8 to post bronchodilator. FEV1/FVC 53, Total lung capacity is 105% of predicted. Normal diffusion. Moderately severe obstructive pattern with no reversibility.    Hyperlipidemia 06/14/2016    Impaired fasting glucose 03/20/2019    Polycythemia secondary to smoking 07/07/2016 07/07/2016--routine physical exam.  CBC reveals hemoglobin elevated at 17.9 and hematocrit elevated at 53.5.  Patient has a somewhat heavy smoker and I feel this is the etiology of this and will not pursue hematologic workup, at least at the present time.  Patient is in the process of trying to discontinue cigarettes.  Aspirin 81 mg per day initiated.    Pulmonary nodule, 12/16/2021--small left upper lobe micronodule.  Possible mucoid impaction right middle lobe. 11/01/2023 December 16, 2021--low-dose chest CT revealed solitary left upper lobe 3 mm peripheral micronodule.  Focal areas of pleural-based thickening seen in the right middle lobe with an adjacent linear area of opacity likely to be mucoid impaction measuring 6 mm in length and less than 2 mm in width.  No other focal lung opacity seen.  Mild hyperexpansion suggestive of COPD.  Moderate coronary artery desmond    Vitamin D deficiency 06/14/2016         Past Surgical History:   Procedure Laterality Date    COLONOSCOPY  09/12/2013 09/12/2013--colonoscopy revealed a 3-4 mm polyp in the sigmoid colon which was biopsied and removed. Otherwise, normal colonoscopy to cecum with an excellent prep. Pathology returned hyperplastic.    COLONOSCOPY N/A 7/28/2020 July 28, 2020--colonoscopy revealed a 1 mm polyp in the rectum.  #4, 1 to 3 mm polyps in the rectosigmoid colon.  #1, 1 mm polyp in the rectum.  Polyps were removed.   "Pathology returned tubular adenoma in the proximal ascending and sigmoid colon.  Hyperplastic x3.    KNEE ARTHROSCOPY Right 33 years of age    Age 33--right meniscus surgery.         No Known Allergies        Current Outpatient Medications:     albuterol sulfate  (90 Base) MCG/ACT inhaler, Inhale 2 puffs every 4-6 hours as needed for wheezing and shortness of breath., Disp: 18 g, Rfl: 6    aspirin 81 MG EC tablet, Take 1 p.o. 3 days a week on Monday, Wednesday, and Friday for heart protection, Disp: , Rfl:     atorvastatin (LIPITOR) 40 MG tablet, Take 1 tablet (40 mg) every day for high cholesterol, Disp: 90 tablet, Rfl: 3    Umeclidinium-Vilanterol (Anoro Ellipta) 62.5-25 MCG/ACT aerosol powder  inhaler, INHALE 1 DOSE BY MOUTH DAILY, Disp: 180 each, Rfl: 3      Family History   Problem Relation Age of Onset    Cancer Mother     Heart attack Father     Anuerysm Brother         Cerebral Artery Aneurysm. Brother  of a brain aneurysm at age 52.    Hypertension Other         Benign Essential    Heart disease Other     Malig Hyperthermia Neg Hx          Social History     Socioeconomic History    Marital status:     Number of children: 2    Highest education level: 8th grade   Tobacco Use    Smoking status: Former     Current packs/day: 0.00     Average packs/day: 2.0 packs/day for 5.0 years (10.0 ttl pk-yrs)     Types: Cigarettes     Start date: 2018     Quit date: 2023     Years since quittin.2    Smokeless tobacco: Never    Tobacco comments:     Caffeine use    Vaping Use    Vaping status: Never Used   Substance and Sexual Activity    Alcohol use: Yes     Comment: 6 BEERS A DAY     Drug use: No    Sexual activity: Yes     Partners: Female         Vitals:    24 0719   BP: 100/62   BP Location: Right arm   Patient Position: Sitting   Cuff Size: Adult   Pulse: 95   Resp: 14   SpO2: 90%   Weight: 76.2 kg (168 lb)   Height: 180.3 cm (70.98\")        Body mass index is 23.44 " kg/m².      Physical Exam:    General: Alert and oriented x 3.  No acute distress.  Normal affect.  HEENT: Pupils equal, round, reactive to light; extraocular movements intact; sclerae nonicteric; pharynx, ear canals and TMs normal.  Neck: Without JVD, thyromegaly, bruit, or adenopathy.  Lungs: Mostly clear but occasional scattered rhonchi.  Slight end expiratory wheezes throughout.  No signs of consolidation.  Heart: Regular rate and rhythm without murmur, rub, gallop, or click.  Abdomen: Soft, nontender, without hepatosplenomegaly or hernia.  Bowel sounds normal.  : Deferred.  Rectal: Deferred.  Extremities: Without clubbing, cyanosis, edema, or pulse deficit.  Neurologic: Intact without focal deficit.  Normal station and gait observed during ingress and egress from the examination room.  Skin: Without significant lesion.  Musculoskeletal: Unremarkable.    Lab/other results:    SARS antibodies are positive.  Thyroid function tests are normal.  Hemoglobin A1c 6.0.  Total cholesterol 145, triglyceride 133, LDL particle #782, HDL particle #34.0.  CMP totally normal except total protein low at 5.9.  CBC is totally normal.    Assessment/Plan:     Diagnosis Plan   1. Impaired fasting glucose  Comprehensive Metabolic Panel    Hemoglobin A1c    Urinalysis With Microscopic If Indicated (No Culture) - Urine, Clean Catch      2. Hyperlipidemia  CK    Comprehensive Metabolic Panel    NMR LipoProfile    TSH    T4, Free    T3, Free    atorvastatin (LIPITOR) 40 MG tablet      3. Benign prostatic hyperplasia with weak urinary stream  PSA DIAGNOSTIC      4. Vitamin D deficiency  Vitamin D,25-Hydroxy      5. Polycythemia secondary to smoking  CBC (No Diff)      6. Coronary artery calcification of native artery,November 8, 2023--coronary calcium score 2023.4        7. Cigarette nicotine dependence with nicotine-induced disorder        8. Chronic obstructive pulmonary disease (COPD)  Umeclidinium-Vilanterol (Anoro Ellipta) 62.5-25  MCG/ACT aerosol powder  inhaler      9. Generalized osteoarthritis of multiple sites        10. Heavy cigarette smoker        11. History of colon polyps, 7/28/2020--tubular adenoma x 2.  Hyperplastic x 3.  09/12/2013--hyperplastic ×1.        12. Pulmonary nodule, 12/16/2021--small left upper lobe micronodule.  Possible mucoid impaction right middle lobe.        13. Acute bronchitis with bronchospasm        14. Influenza B        15. Therapeutic drug monitoring        16. History of 2019 novel coronavirus disease (COVID-19)  SARS-CoV-2 Antibodies, Nucleocapsid (Natural Immunity)      17. Routine physical examination  SARS-CoV-2 Antibodies, Nucleocapsid (Natural Immunity)    CBC (No Diff)    CK    Comprehensive Metabolic Panel    Hemoglobin A1c    NMR LipoProfile    TSH    T4, Free    T3, Free    PSA DIAGNOSTIC    Urinalysis With Microscopic If Indicated (No Culture) - Urine, Clean Catch    Vitamin D,25-Hydroxy        Patient has impaired fasting glucose that does not require medication.  I strongly recommended that he follow a low carbohydrate diet.  Also patient has been on steroids which may have raised his A1c slightly.  Hyperlipidemia is now under excellent control which is important given his coronary artery calcification noted last November on a coronary calcium score.  His calcium score was 2023.4.  Patient is a smoker and continues to smoke and is not quite ready to commit to smoking cessation despite the problems he has been having including a recent exacerbation of COPD and also recent influenza.  His symptoms are better after treatment.  BPH symptoms are tolerable at the present time without medication.  He has generalized osteoarthritis and expected aches and pains regarding this not requiring medication.  He has a history of colon polyps and is up-to-date on his colonoscopy.  Patient has a history of a pulmonary nodule which has been stable and likely related to mucoid impaction.    Plan is as follows:  No change in current medical regimen.  Patient will follow-up on or shortly after January 24, 2025 with lab prior for his annual physical.  Otherwise he will follow-up as needed.        Procedures

## 2024-10-21 ENCOUNTER — TELEPHONE (OUTPATIENT)
Dept: INTERNAL MEDICINE | Facility: CLINIC | Age: 64
End: 2024-10-21

## 2024-10-21 NOTE — TELEPHONE ENCOUNTER
Caller: Hammad Justice    Relationship: Self    Best call back number: [phone]     What orders are you requesting (i.e. lab or imaging): YEARLY LUNG CANCER SCREENING ORDERS    In what timeframe would the patient need to come in: ASAP    Where will you receive your lab/imaging services: [facility]    Additional notes: PLEASE CALL PATIENT WHEN THESE ORDERS ARE PLACED

## 2024-11-12 ENCOUNTER — TELEPHONE (OUTPATIENT)
Dept: INTERNAL MEDICINE | Facility: CLINIC | Age: 64
End: 2024-11-12

## 2024-11-12 NOTE — TELEPHONE ENCOUNTER
Caller: Hammad Justice    Relationship: Self    Best call back number: [phone]    What medication are you requesting: Z PACK    What are your current symptoms: PATIENT FEELS LIKE HE HAS INFECTION IN CHEST, SPITTING UP GREEN     How long have you been experiencing symptoms: 11/12    Have you had these symptoms before:    [x] Yes  [] No    Have you been treated for these symptoms before:   [x] Yes  [] No    If a prescription is needed, what is your preferred pharmacy and phone number:  [facility] [id number] - [phone]  - [phone]  [phone]     Additional notes: PLEASE ADVISE.

## 2024-11-13 DIAGNOSIS — J41.1 MUCOPURULENT CHRONIC BRONCHITIS: Primary | Chronic | ICD-10-CM

## 2024-11-13 RX ORDER — PREDNISONE 10 MG/1
TABLET ORAL
Qty: 46 TABLET | Refills: 0 | Status: SHIPPED | OUTPATIENT
Start: 2024-11-13

## 2024-11-13 RX ORDER — AZITHROMYCIN 250 MG/1
TABLET, FILM COATED ORAL
Qty: 6 TABLET | Refills: 0 | Status: SHIPPED | OUTPATIENT
Start: 2024-11-13

## 2025-01-20 ENCOUNTER — OFFICE VISIT (OUTPATIENT)
Dept: CARDIOLOGY | Facility: CLINIC | Age: 65
End: 2025-01-20
Payer: COMMERCIAL

## 2025-01-20 VITALS
HEIGHT: 71 IN | BODY MASS INDEX: 23.1 KG/M2 | OXYGEN SATURATION: 93 % | WEIGHT: 165 LBS | DIASTOLIC BLOOD PRESSURE: 70 MMHG | HEART RATE: 100 BPM | SYSTOLIC BLOOD PRESSURE: 130 MMHG

## 2025-01-20 DIAGNOSIS — Z72.0 TOBACCO ABUSE: Primary | ICD-10-CM

## 2025-01-20 PROCEDURE — 99406 BEHAV CHNG SMOKING 3-10 MIN: CPT | Performed by: NURSE PRACTITIONER

## 2025-01-20 PROCEDURE — 93000 ELECTROCARDIOGRAM COMPLETE: CPT | Performed by: NURSE PRACTITIONER

## 2025-01-20 PROCEDURE — 99214 OFFICE O/P EST MOD 30 MIN: CPT | Performed by: NURSE PRACTITIONER

## 2025-01-20 NOTE — PROGRESS NOTES
Date of Office Visit: 2025  Encounter Provider: KEVIN Ryder  Place of Service: [facility] CARDIOLOGY  Patient Name: Hammad Justice  :1960    Chief complaint: Coronary artery disease    HPI: Hammad Justice is a 64 y.o. male who is a patient of Dr. Chase who was seen in January of last year for the first time.  He is new to me today he has a history of tobacco abuse and COPD and was noted to have some coronary calcification and he presented for evaluation of his cardiovascular risk.  He had a formal coronary artery calcification score in 2023 that was significantly elevated.  Given these findings it was recommended he started on aspirin and a cholesterol medication.  He does not do a lot of heavy exercise but usually gets it around 6-10,000 steps a day.  We did a treadmill stress test for risk factor stratification.  Unremarkable.    He comes in today for follow-up. He denies chest pain or tightness. Shortness of breath remains the same. He does state that this is at baseline. He started smoking again.  Previous testing and notes have been reviewed by me.     We went over his stress test results  24 Exercise stress test    No ECG evidence of myocardial ischemia.    Negative clinical evidence of myocardial ischemia.    Findings consistent with a normal ECG stress test.     Latest Reference Range & Units 24 07:50   Sodium 136 - 145 mmol/L 141   Potassium 3.5 - 5.2 mmol/L 3.6   Chloride 98 - 107 mmol/L 102   CO2 22.0 - 29.0 mmol/L 28.0   Anion Gap 5.0 - 15.0 mmol/L 11.0   BUN 8 - 23 mg/dL 14   Creatinine 0.76 - 1.27 mg/dL 0.86   BUN/Creatinine Ratio 7.0 - 25.0  16.3   eGFR >60.0 mL/min/1.73 98.5   Glucose 65 - 99 mg/dL 77   Calcium 8.6 - 10.5 mg/dL 8.6   Alkaline Phosphatase 39 - 117 U/L 67   Total Protein 6.0 - 8.5 g/dL 5.9 (L)   Albumin 3.5 - 5.2 g/dL 3.9   Globulin gm/dL 2.0   A/G Ratio g/dL 2.0   AST (SGOT) 1 - 40 U/L 20   ALT (SGPT) 1 - 41 U/L  36   Total Bilirubin 0.0 - 1.2 mg/dL 0.4   Hemoglobin A1C 4.80 - 5.60 % 6.00 (H)   TSH Baseline 0.270 - 4.200 uIU/mL 0.347   Free T4 0.93 - 1.70 ng/dL 1.49   T3, Free 2.00 - 4.40 pg/mL 3.21   Total Cholesterol 100 - 199 mg/dL 145   HDL-P (Total) >=30.5 umol/L 34.0   HDL-C >39 mg/dL 62   Small LDL-P <=527 nmol/L 299   LDL-C 0 - 99 mg/dL 60   LDL-P <1000 nmol/L 782   Triglycerides 0 - 149 mg/dL 133   WBC 3.40 - 10.80 10*3/mm3 8.49   RBC 4.14 - 5.80 10*6/mm3 4.79   Hemoglobin 13.0 - 17.7 g/dL 15.5   Hematocrit 37.5 - 51.0 % 45.2   Platelets 140 - 450 10*3/mm3 315   RDW 12.3 - 15.4 % 12.6   MCV 79.0 - 97.0 fL 94.4   MCH 26.6 - 33.0 pg 32.4   MCHC 31.5 - 35.7 g/dL 34.3   MPV 6.0 - 12.0 fL 10.5   RDW-SD 37.0 - 54.0 fl 43.4   (L): Data is abnormally low  (H): Data is abnormally high     Past Medical History:   Diagnosis Date    Benign prostatic hyperplasia with weak urinary stream 03/20/2019 March 20, 2019--patient presents with a progressively worsening decreased urine flow over the past year or so.  He reports he just takes a long time for him to initiate and complete urination.  He has no pain or dysuria.  No blood noted in his urine.  Urinalysis today reveals 31-50 WBCs but there are 3-6 epithelial cells present as well as 1+ calcium oxalate crystals.  No bacteria seen.  No red bl    Calcium oxalate crystals present in urine 03/20/2019 March 20, 2019--patient presents with a progressively worsening decreased urine flow over the past year or so.  He reports he just takes a long time for him to initiate and complete urination.  He has no pain or dysuria.  No blood noted in his urine.  Urinalysis today reveals 31-50 WBCs but there are 3-6 epithelial cells present as well as 1+ calcium oxalate crystals.  No bacteria seen.  No red bl    Chronic obstructive pulmonary disease (COPD) 08/12/2013 08/12/2013--pulmonary function tests revealed FVC 3.35, 66% of predicted. Post bronchodilator FVC 3.31. FEV1 1.77, 45% of  predicted. 1.8 to post bronchodilator. FEV1/FVC 53, Total lung capacity is 105% of predicted. Normal diffusion. Moderately severe obstructive pattern with no reversibility.   08/12/2013--pulmonary consult. Pulmonary function tests revealed moderate to severe obstructive pattern    Cigarette nicotine dependence with nicotine-induced disorder 06/14/2016    COPD (chronic obstructive pulmonary disease) 08/12/2013 08/12/2013--pulmonary function tests revealed FVC 3.35, 66% of predicted. Post bronchodilator FVC 3.31. FEV1 1.77, 45% of predicted. 1.8 to post bronchodilator. FEV1/FVC 53, Total lung capacity is 105% of predicted. Normal diffusion. Moderately severe obstructive pattern with no reversibility.   08/12/2013--pulmonary consult. Pulmonary function tests revealed moderate to severe obstructive pattern. Diagnosis COPD. Chronic bronchitis type. Patient was started on Ventolin when necessary and Spiriva HandiHaler use once daily.    Coronary artery calcification of native artery,November 8, 2023--coronary calcium score 2023.4 11/01/2023 November 8, 2023--coronary calcium score 2023.4     December 16, 2021--low-dose chest CT revealed solitary left upper lobe 3 mm peripheral micronodule.  Focal areas of pleural-based thickening seen in the right middle lobe with an adjacent linear area of opacity likely to be mucoid impaction measuring 6 mm in length and less than 2 mm in width.  No other focal lung opacity seen.  Mild hyperexpansi    Generalized osteoarthritis of multiple sites 06/14/2016    Heavy cigarette smoker 11/01/2023    History of 2019 novel coronavirus disease (COVID-19) 03/21/2024    History of Benign essential hypertension 04/04/2012 04/04/2012--blood pressure remains low and patient had to discontinue lisinopril because of dizziness.   03/14/2012--blood pressure too low at 102/64. Lisinopril HCT decreased to 10/12.5.   02/22/2012--blood pressure elevated at 140/90. Lisinopril HCT 20/12.5 daily  initiated.    History of colon polyps, 7/28/2020--tubular adenoma x 2.  Hyperplastic x 3.  09/12/2013--hyperplastic ×1. 09/12/2013 July 28, 2020--colonoscopy revealed a 1 mm polyp in the rectum.  #4, 1 to 3 mm polyps in the rectosigmoid colon.  #1, 1 mm polyp in the rectum.  Polyps were removed.  Pathology returned tubular adenoma in the proximal ascending and sigmoid colon.  Hyperplastic x3.  09/12/2013--colonoscopy revealed a 3-4 mm polyp in the sigmoid colon which was biopsied and removed. Otherwise, normal colonoscopy to     History of pulmonary function tests 08/12/2013 08/12/2013--pulmonary function tests revealed FVC 3.35, 66% of predicted. Post bronchodilator FVC 3.31. FEV1 1.77, 45% of predicted. 1.8 to post bronchodilator. FEV1/FVC 53, Total lung capacity is 105% of predicted. Normal diffusion. Moderately severe obstructive pattern with no reversibility.    Hyperlipidemia 06/14/2016    Impaired fasting glucose 03/20/2019    Polycythemia secondary to smoking 07/07/2016 07/07/2016--routine physical exam.  CBC reveals hemoglobin elevated at 17.9 and hematocrit elevated at 53.5.  Patient has a somewhat heavy smoker and I feel this is the etiology of this and will not pursue hematologic workup, at least at the present time.  Patient is in the process of trying to discontinue cigarettes.  Aspirin 81 mg per day initiated.    Pulmonary nodule, 12/16/2021--small left upper lobe micronodule.  Possible mucoid impaction right middle lobe. 11/01/2023 December 16, 2021--low-dose chest CT revealed solitary left upper lobe 3 mm peripheral micronodule.  Focal areas of pleural-based thickening seen in the right middle lobe with an adjacent linear area of opacity likely to be mucoid impaction measuring 6 mm in length and less than 2 mm in width.  No other focal lung opacity seen.  Mild hyperexpansion suggestive of COPD.  Moderate coronary artery desmond    Vitamin D deficiency 06/14/2016       Past Surgical History:    Procedure Laterality Date    COLONOSCOPY  2013--colonoscopy revealed a 3-4 mm polyp in the sigmoid colon which was biopsied and removed. Otherwise, normal colonoscopy to cecum with an excellent prep. Pathology returned hyperplastic.    COLONOSCOPY N/A 2020--colonoscopy revealed a 1 mm polyp in the rectum.  #4, 1 to 3 mm polyps in the rectosigmoid colon.  #1, 1 mm polyp in the rectum.  Polyps were removed.  Pathology returned tubular adenoma in the proximal ascending and sigmoid colon.  Hyperplastic x3.    KNEE ARTHROSCOPY Right 33 years of age    Age 33--right meniscus surgery.       Social History     Socioeconomic History    Marital status:     Number of children: 2    Highest education level: 8th grade   Tobacco Use    Smoking status: Former     Current packs/day: 0.00     Average packs/day: 2.0 packs/day for 5.0 years (10.0 ttl pk-yrs)     Types: Cigarettes     Start date: 2018     Quit date: 2023     Years since quittin.0    Smokeless tobacco: Never    Tobacco comments:     Caffeine use    Vaping Use    Vaping status: Never Used   Substance and Sexual Activity    Alcohol use: Yes     Comment: 6 BEERS A DAY     Drug use: No    Sexual activity: Yes     Partners: Female       Family History   Problem Relation Age of Onset    Cancer Mother     Heart attack Father     Anuerysm Brother         Cerebral Artery Aneurysm. Brother  of a brain aneurysm at age 52.    Hypertension Other         Benign Essential    Heart disease Other     Malig Hyperthermia Neg Hx        Review of Systems   Constitutional: Negative for diaphoresis and malaise/fatigue.   Cardiovascular:  Negative for chest pain, claudication, dyspnea on exertion, irregular heartbeat, leg swelling, near-syncope, orthopnea, palpitations, paroxysmal nocturnal dyspnea and syncope.   Respiratory:  Negative for cough, shortness of breath and sleep disturbances due to breathing.    Musculoskeletal:   "Negative for falls.   Neurological:  Negative for dizziness and weakness.   Psychiatric/Behavioral:  Negative for altered mental status and substance abuse.        No Known Allergies      Current Outpatient Medications:     albuterol sulfate  (90 Base) MCG/ACT inhaler, Inhale 2 puffs every 4-6 hours as needed for wheezing and shortness of breath., Disp: 18 g, Rfl: 6    aspirin 81 MG EC tablet, Take 1 p.o. 3 days a week on Monday, Wednesday, and Friday for heart protection, Disp: , Rfl:     atorvastatin (LIPITOR) 40 MG tablet, Take 1 tablet (40 mg) every day for high cholesterol, Disp: 90 tablet, Rfl: 3    Umeclidinium-Vilanterol (Anoro Ellipta) 62.5-25 MCG/ACT aerosol powder  inhaler, INHALE 1 DOSE BY MOUTH DAILY, Disp: 180 each, Rfl: 3        Objective:     Vitals:    01/20/25 0943   BP: 130/70   BP Location: Left arm   Patient Position: Sitting   Pulse: 100   SpO2: 93%   Weight: 74.8 kg (165 lb)   Height: 180.3 cm (70.98\")     Body mass index is 23.03 kg/m².    PHYSICAL EXAM:    Constitutional:       General: Not in acute distress.     Appearance: Normal appearance. Well-developed.   Eyes:      Pupils: Pupils are equal, round, and reactive to light.   HENT:      Head: Normocephalic.   Neck:      Vascular: No carotid bruit or JVD.   Pulmonary:      Effort: Pulmonary effort is normal. No tachypnea.      Breath sounds: Normal breath sounds. No wheezing. No rales.   Cardiovascular:      Normal rate. Regular rhythm.      No gallop.    Pulses:     Intact distal pulses.   Edema:     Peripheral edema absent.   Abdominal:      General: Bowel sounds are normal.      Palpations: Abdomen is soft.      Tenderness: There is no abdominal tenderness.   Musculoskeletal: Normal range of motion.      Cervical back: Normal range of motion and neck supple. No edema. Skin:     General: Skin is warm and dry.   Neurological:      Mental Status: Alert and oriented to person, place, and time.           ECG 12 Lead    Date/Time: " 1/20/2025 1:32 PM  Performed by: Nohemi aBllard APRN    Authorized by: Nohemi Ballard APRN  Comparison: compared with previous ECG from 1/18/2024  Similar to previous ECG  Rhythm: sinus rhythm  Rate: normal  QRS axis: normal    Clinical impression: normal ECG        Lipid Panel          3/14/2024    07:50   Lipid Panel   Total Cholesterol 145    Triglycerides 133          Assessment/Plan:      1.  Severe coronary calcium-negative treadmill stress test.  No complaints of chest discomfort.  Continue with aspirin 81 mg daily and atorvastatin 40 mg daily    2.  Dyslipidemia goal LDL less than 70 continue atorvastatin 40 mg daily.  Plan to get labs next week with PCP    3.  Ongoing tobacco abuse.  Hammad Justice  reports that he quit smoking about 2 years ago. His smoking use included cigarettes. He started smoking about 7 years ago. He has a 10 pack-year smoking history. He has never used smokeless tobacco. I have educated him on the risk of diseases from using tobacco products such as cancer, COPD, and heart disease.     I advised him to quit and he is willing to quit. We have discussed the following method/s for tobacco cessation:  Cold [place].  Together we have set a quit date for  1months .  He will follow up with me in 6 months or sooner to check on his progress.    I spent 5 minutes counseling the patient.         Follow-up in 1 year with Dr. Chase       Your medication list            Accurate as of January 20, 2025 10:02 AM. If you have any questions, ask your nurse or doctor.                CONTINUE taking these medications        Instructions Last Dose Given Next Dose Due   albuterol sulfate  (90 Base) MCG/ACT inhaler  Commonly known as: PROVENTIL HFA;VENTOLIN HFA;PROAIR HFA      Inhale 2 puffs every 4-6 hours as needed for wheezing and shortness of breath.       Anoro Ellipta 62.5-25 MCG/ACT aerosol powder  inhaler  Generic drug: Umeclidinium-Vilanterol      INHALE 1 DOSE BY MOUTH DAILY        aspirin 81 MG EC tablet      Take 1 p.o. 3 days a week on Monday, Wednesday, and Friday for heart protection       atorvastatin 40 MG tablet  Commonly known as: LIPITOR      Take 1 tablet (40 mg) every day for high cholesterol              STOP taking these medications      azithromycin 250 MG tablet  Commonly known as: Zithromax  Stopped by: Nohemi Ballard        predniSONE 10 MG tablet  Commonly known as: DELTASONE  Stopped by: Nohemi Ballard                   As always, it has been a pleasure to participate in your patient's care.      Sincerely,     Nohemi BROWN

## 2025-01-28 ENCOUNTER — OFFICE VISIT (OUTPATIENT)
Dept: INTERNAL MEDICINE | Facility: CLINIC | Age: 65
End: 2025-01-28
Payer: COMMERCIAL

## 2025-01-28 ENCOUNTER — TELEPHONE (OUTPATIENT)
Dept: INTERNAL MEDICINE | Facility: CLINIC | Age: 65
End: 2025-01-28

## 2025-01-28 VITALS
OXYGEN SATURATION: 92 % | SYSTOLIC BLOOD PRESSURE: 132 MMHG | BODY MASS INDEX: 23.52 KG/M2 | WEIGHT: 168 LBS | RESPIRATION RATE: 16 BRPM | HEIGHT: 71 IN | TEMPERATURE: 98.3 F | HEART RATE: 99 BPM | DIASTOLIC BLOOD PRESSURE: 68 MMHG

## 2025-01-28 DIAGNOSIS — R91.1 PULMONARY NODULE: Chronic | ICD-10-CM

## 2025-01-28 DIAGNOSIS — I25.84 CORONARY ARTERY CALCIFICATION OF NATIVE ARTERY: Chronic | ICD-10-CM

## 2025-01-28 DIAGNOSIS — R39.12 BENIGN PROSTATIC HYPERPLASIA WITH WEAK URINARY STREAM: Chronic | ICD-10-CM

## 2025-01-28 DIAGNOSIS — E78.2 MIXED HYPERLIPIDEMIA: Chronic | ICD-10-CM

## 2025-01-28 DIAGNOSIS — E55.9 VITAMIN D DEFICIENCY: Chronic | ICD-10-CM

## 2025-01-28 DIAGNOSIS — N40.1 BENIGN PROSTATIC HYPERPLASIA WITH WEAK URINARY STREAM: Chronic | ICD-10-CM

## 2025-01-28 DIAGNOSIS — R73.01 IMPAIRED FASTING GLUCOSE: Chronic | ICD-10-CM

## 2025-01-28 DIAGNOSIS — M15.9 GENERALIZED OSTEOARTHRITIS OF MULTIPLE SITES: Chronic | ICD-10-CM

## 2025-01-28 DIAGNOSIS — F17.210 HEAVY CIGARETTE SMOKER: Chronic | ICD-10-CM

## 2025-01-28 DIAGNOSIS — I25.10 CORONARY ARTERY CALCIFICATION OF NATIVE ARTERY: Chronic | ICD-10-CM

## 2025-01-28 DIAGNOSIS — J41.1 MUCOPURULENT CHRONIC BRONCHITIS: Chronic | ICD-10-CM

## 2025-01-28 DIAGNOSIS — Z51.81 THERAPEUTIC DRUG MONITORING: ICD-10-CM

## 2025-01-28 DIAGNOSIS — Z00.00 ROUTINE PHYSICAL EXAMINATION: Primary | ICD-10-CM

## 2025-01-28 DIAGNOSIS — D75.1 POLYCYTHEMIA SECONDARY TO SMOKING: Chronic | ICD-10-CM

## 2025-01-28 DIAGNOSIS — Z86.16 HISTORY OF 2019 NOVEL CORONAVIRUS DISEASE (COVID-19): Chronic | ICD-10-CM

## 2025-01-28 DIAGNOSIS — Z86.0100 HISTORY OF COLON POLYPS: Chronic | ICD-10-CM

## 2025-01-28 PROCEDURE — 99214 OFFICE O/P EST MOD 30 MIN: CPT | Performed by: INTERNAL MEDICINE

## 2025-01-28 PROCEDURE — 99396 PREV VISIT EST AGE 40-64: CPT | Performed by: INTERNAL MEDICINE

## 2025-01-28 RX ORDER — TAMSULOSIN HYDROCHLORIDE 0.4 MG/1
CAPSULE ORAL
Qty: 90 CAPSULE | Refills: 3 | Status: SHIPPED | OUTPATIENT
Start: 2025-01-28 | End: 2025-01-28 | Stop reason: SDUPTHER

## 2025-01-28 RX ORDER — ATORVASTATIN CALCIUM 40 MG/1
TABLET, FILM COATED ORAL
Qty: 90 TABLET | Refills: 3 | Status: SHIPPED | OUTPATIENT
Start: 2025-01-28

## 2025-01-28 RX ORDER — EZETIMIBE 10 MG/1
TABLET ORAL
Qty: 90 TABLET | Refills: 3 | Status: SHIPPED | OUTPATIENT
Start: 2025-01-28

## 2025-01-28 RX ORDER — EZETIMIBE 10 MG/1
TABLET ORAL
Qty: 90 TABLET | Refills: 3 | Status: SHIPPED | OUTPATIENT
Start: 2025-01-28 | End: 2025-01-28 | Stop reason: SDUPTHER

## 2025-01-28 RX ORDER — TAMSULOSIN HYDROCHLORIDE 0.4 MG/1
CAPSULE ORAL
Qty: 90 CAPSULE | Refills: 3 | Status: SHIPPED | OUTPATIENT
Start: 2025-01-28

## 2025-01-28 NOTE — TELEPHONE ENCOUNTER
Caller: Hammad Justice    Relationship: Self    Best call back number: [phone]    Which medication are you concerned about: ezetimibe (Zetia) 10 MG tablet  AND    atorvastatin (LIPITOR) 40 MG tablet     Who prescribed you this medication: DR JOHNSTON    When did you start taking this medication: HAS NOT STARTED THE NEW MEDS YET    What are your concerns: PATIENT WANTED TO BE SURE THAT HE IS TO TAKE BOTH MEDICATIONS AND DOES NOT STOP THE OLDER ONE.    PATIENT ALSO QUESTIONED IF HE WAS TO GET THE VITAMIN D AS A SCRIPT OR IS THAT SUPPOSED TO BE OVER THE COUNTER.    IF A SCRIPT WILL NEED TO BE SENT AGAIN PHARMACY DID NOT FILL.    How long have you had these concerns: TODAY

## 2025-01-28 NOTE — PROGRESS NOTES
01/28/2025    Patient Information  Hammad Justice                                                                                          [address]  [facility] [address]      1960  [unfilled]  There is no work phone number on file.    Chief Complaint:     Routine annual physical exam/preventative visit.  No new acute complaints.    History of Present Illness:    Patient with chronic medical problems as noted below in the assessment and plan presents today for his routine annual physical/preventative visit.  His past medical history reviewed and updated were necessary including health maintenance parameters.  This reveals he needs a colonoscopy which I have ordered.    Review of Systems   Constitutional: Negative.   HENT: Negative.     Eyes: Negative.    Cardiovascular:  Positive for dyspnea on exertion.   Respiratory:  Positive for cough, shortness of breath and sputum production. Negative for hemoptysis.         Smoker's cough   Endocrine: Negative.    Hematologic/Lymphatic: Negative.    Skin: Negative.    Musculoskeletal: Negative.    Gastrointestinal: Negative.    Genitourinary: Negative.    Neurological: Negative.    Psychiatric/Behavioral: Negative.     Allergic/Immunologic: Negative.        Active Problems:    Patient Active Problem List   Diagnosis    Chronic obstructive pulmonary disease (COPD)    History of colon polyps, 7/28/2020--tubular adenoma x 2.  Hyperplastic x 3.  09/12/2013--hyperplastic ×1.    Generalized osteoarthritis of multiple sites    Hyperlipidemia    Cigarette nicotine dependence with nicotine-induced disorder    Vitamin D deficiency    Therapeutic drug monitoring    Routine physical examination    Polycythemia secondary to smoking    Benign prostatic hyperplasia with weak urinary stream    Impaired fasting glucose    Pulmonary nodule, 12/16/2021--small left upper lobe micronodule.  Possible mucoid impaction right middle lobe.    Coronary artery calcification of  native artery,November 8, 2023--coronary calcium score 2023.4    Heavy cigarette smoker    History of 2019 novel coronavirus disease (COVID-19)         Past Medical History:   Diagnosis Date    Benign prostatic hyperplasia with weak urinary stream 03/20/2019 March 20, 2019--patient presents with a progressively worsening decreased urine flow over the past year or so.  He reports he just takes a long time for him to initiate and complete urination.  He has no pain or dysuria.  No blood noted in his urine.  Urinalysis today reveals 31-50 WBCs but there are 3-6 epithelial cells present as well as 1+ calcium oxalate crystals.  No bacteria seen.  No red bl    Calcium oxalate crystals present in urine 03/20/2019 March 20, 2019--patient presents with a progressively worsening decreased urine flow over the past year or so.  He reports he just takes a long time for him to initiate and complete urination.  He has no pain or dysuria.  No blood noted in his urine.  Urinalysis today reveals 31-50 WBCs but there are 3-6 epithelial cells present as well as 1+ calcium oxalate crystals.  No bacteria seen.  No red bl    Chronic obstructive pulmonary disease (COPD) 08/12/2013 08/12/2013--pulmonary function tests revealed FVC 3.35, 66% of predicted. Post bronchodilator FVC 3.31. FEV1 1.77, 45% of predicted. 1.8 to post bronchodilator. FEV1/FVC 53, Total lung capacity is 105% of predicted. Normal diffusion. Moderately severe obstructive pattern with no reversibility.   08/12/2013--pulmonary consult. Pulmonary function tests revealed moderate to severe obstructive pattern    Cigarette nicotine dependence with nicotine-induced disorder 06/14/2016    COPD (chronic obstructive pulmonary disease) 08/12/2013 08/12/2013--pulmonary function tests revealed FVC 3.35, 66% of predicted. Post bronchodilator FVC 3.31. FEV1 1.77, 45% of predicted. 1.8 to post bronchodilator. FEV1/FVC 53, Total lung capacity is 105% of predicted. Normal  diffusion. Moderately severe obstructive pattern with no reversibility.   08/12/2013--pulmonary consult. Pulmonary function tests revealed moderate to severe obstructive pattern. Diagnosis COPD. Chronic bronchitis type. Patient was started on Ventolin when necessary and Spiriva HandiHaler use once daily.    Coronary artery calcification of native artery,November 8, 2023--coronary calcium score 2023.4 11/01/2023 November 8, 2023--coronary calcium score 2023.4     December 16, 2021--low-dose chest CT revealed solitary left upper lobe 3 mm peripheral micronodule.  Focal areas of pleural-based thickening seen in the right middle lobe with an adjacent linear area of opacity likely to be mucoid impaction measuring 6 mm in length and less than 2 mm in width.  No other focal lung opacity seen.  Mild hyperexpansi    Generalized osteoarthritis of multiple sites 06/14/2016    Heavy cigarette smoker 11/01/2023    History of 2019 novel coronavirus disease (COVID-19) 03/21/2024    History of Benign essential hypertension 04/04/2012 04/04/2012--blood pressure remains low and patient had to discontinue lisinopril because of dizziness.   03/14/2012--blood pressure too low at 102/64. Lisinopril HCT decreased to 10/12.5.   02/22/2012--blood pressure elevated at 140/90. Lisinopril HCT 20/12.5 daily initiated.    History of colon polyps, 7/28/2020--tubular adenoma x 2.  Hyperplastic x 3.  09/12/2013--hyperplastic ×1. 09/12/2013 July 28, 2020--colonoscopy revealed a 1 mm polyp in the rectum.  #4, 1 to 3 mm polyps in the rectosigmoid colon.  #1, 1 mm polyp in the rectum.  Polyps were removed.  Pathology returned tubular adenoma in the proximal ascending and sigmoid colon.  Hyperplastic x3.  09/12/2013--colonoscopy revealed a 3-4 mm polyp in the sigmoid colon which was biopsied and removed. Otherwise, normal colonoscopy to     History of pulmonary function tests 08/12/2013 08/12/2013--pulmonary function tests revealed FVC 3.35,  66% of predicted. Post bronchodilator FVC 3.31. FEV1 1.77, 45% of predicted. 1.8 to post bronchodilator. FEV1/FVC 53, Total lung capacity is 105% of predicted. Normal diffusion. Moderately severe obstructive pattern with no reversibility.    Hyperlipidemia 06/14/2016    Impaired fasting glucose 03/20/2019    Polycythemia secondary to smoking 07/07/2016 07/07/2016--routine physical exam.  CBC reveals hemoglobin elevated at 17.9 and hematocrit elevated at 53.5.  Patient has a somewhat heavy smoker and I feel this is the etiology of this and will not pursue hematologic workup, at least at the present time.  Patient is in the process of trying to discontinue cigarettes.  Aspirin 81 mg per day initiated.    Pulmonary nodule, 12/16/2021--small left upper lobe micronodule.  Possible mucoid impaction right middle lobe. 11/01/2023 December 16, 2021--low-dose chest CT revealed solitary left upper lobe 3 mm peripheral micronodule.  Focal areas of pleural-based thickening seen in the right middle lobe with an adjacent linear area of opacity likely to be mucoid impaction measuring 6 mm in length and less than 2 mm in width.  No other focal lung opacity seen.  Mild hyperexpansion suggestive of COPD.  Moderate coronary artery desmond    Vitamin D deficiency 06/14/2016         Past Surgical History:   Procedure Laterality Date    COLONOSCOPY  09/12/2013 09/12/2013--colonoscopy revealed a 3-4 mm polyp in the sigmoid colon which was biopsied and removed. Otherwise, normal colonoscopy to cecum with an excellent prep. Pathology returned hyperplastic.    COLONOSCOPY N/A 7/28/2020 July 28, 2020--colonoscopy revealed a 1 mm polyp in the rectum.  #4, 1 to 3 mm polyps in the rectosigmoid colon.  #1, 1 mm polyp in the rectum.  Polyps were removed.  Pathology returned tubular adenoma in the proximal ascending and sigmoid colon.  Hyperplastic x3.    KNEE ARTHROSCOPY Right 33 years of age    Age 33--right meniscus surgery.         No  Known Allergies        Current Outpatient Medications:     Albuterol-Budesonide 90-80 MCG/ACT aerosol, Inhale 2 puffs 4 (Four) Times a Day., Disp: 10.7 g, Rfl: 11    aspirin 81 MG EC tablet, Take 1 p.o. 3 days a week on Monday, Wednesday, and Friday for heart protection, Disp: , Rfl:     atorvastatin (LIPITOR) 40 MG tablet, Take 1 tablet (40 mg) every day for high cholesterol, Disp: 90 tablet, Rfl: 3    ezetimibe (Zetia) 10 MG tablet, Take 1 p.o. daily for high cholesterol.  Take with generic Lipitor., Disp: 90 tablet, Rfl: 3    tamsulosin (FLOMAX) 0.4 MG capsule 24 hr capsule, Take 1 p.o. daily after supper for prostate problems, Disp: 90 capsule, Rfl: 3    Umeclidinium-Vilanterol (Anoro Ellipta) 62.5-25 MCG/ACT aerosol powder  inhaler, INHALE 1 DOSE BY MOUTH DAILY, Disp: 180 each, Rfl: 3    vitamin D3 125 MCG (5000 UT) capsule capsule, 1 by mouth daily as directed, Disp: , Rfl:       Family History   Problem Relation Age of Onset    Cancer Mother     Heart attack Father     Anuerysm Brother         Cerebral Artery Aneurysm. Brother  of a brain aneurysm at age 52.    Hypertension Other         Benign Essential    Heart disease Other     Malig Hyperthermia Neg Hx          Social History     Socioeconomic History    Marital status:     Number of children: 2    Highest education level: 8th grade   Tobacco Use    Smoking status: Some Days     Current packs/day: 0.00     Average packs/day: 2.0 packs/day for 5.0 years (10.0 ttl pk-yrs)     Types: Cigarettes     Start date: 2018     Last attempt to quit: 2023     Years since quittin.0    Smokeless tobacco: Never    Tobacco comments:     Quit smoking, 2023   Vaping Use    Vaping status: Never Used   Substance and Sexual Activity    Alcohol use: Yes     Alcohol/week: 20.0 standard drinks of alcohol     Types: 20 Cans of beer per week     Comment: 6 BEERS A DAY     Drug use: No    Sexual activity: Not Currently     Partners: Female      "Birth control/protection: None         Vitals:    01/28/25 0731   BP: 132/68   Pulse: 99   Resp: 16   Temp: 98.3 °F (36.8 °C)   TempSrc: Oral   SpO2: 92%   Weight: 76.2 kg (168 lb)   Height: 180.3 cm (70.98\")        Body mass index is 23.44 kg/m².      Physical Exam:    General: Alert and oriented x 3.  No acute distress.  Normal affect.  HEENT: Pupils equal, round, reactive to light; extraocular movements intact; sclerae nonicteric; pharynx, ear canals and TMs normal.  Neck: Without JVD, thyromegaly, bruit, or adenopathy.  Lungs: Bilateral end expiratory wheezes without consolidation.  Heart: Regular rate and rhythm without murmur, rub, gallop, or click.  Abdomen: Soft, nontender, without hepatosplenomegaly or hernia.  Bowel sounds normal.  : Deferred.  Rectal: Deferred.  Extremities: Without clubbing, cyanosis, edema, or pulse deficit.  Neurologic: Intact without focal deficit.  Normal station and gait observed during ingress and egress from the examination room.  Skin: Without significant lesion.  Musculoskeletal: Unremarkable.    Lab/other results:    SARS antibodies are positive.  CBC normal except MCV slightly elevated 98.  CK normal at 127.  CMP normal except AST 63 and .  Hemoglobin A1c 5.8.  Total cholesterol 154, triglyceride 133, LDL particle #350, HDL particle #44.6.  Thyroid function tests are normal.  PSA normal at 1.7.  Urinalysis normal.  Vitamin D is low at 21.3.    Assessment/Plan:     Diagnosis Plan   1. Routine physical examination        2. Impaired fasting glucose  Comprehensive Metabolic Panel      3. Hyperlipidemia  Comprehensive Metabolic Panel    NMR LipoProfile    CK    atorvastatin (LIPITOR) 40 MG tablet    ezetimibe (Zetia) 10 MG tablet      4. Chronic obstructive pulmonary disease (COPD)  Albuterol-Budesonide 90-80 MCG/ACT aerosol      5. Heavy cigarette smoker        6. Polycythemia secondary to smoking  CBC (No Diff)      7. Benign prostatic hyperplasia with weak urinary " stream  tamsulosin (FLOMAX) 0.4 MG capsule 24 hr capsule      8. History of 2019 novel coronavirus disease (COVID-19)        9. Vitamin D deficiency  vitamin D3 125 MCG (5000 UT) capsule capsule      10. History of colon polyps, 7/28/2020--tubular adenoma x 2.  Hyperplastic x 3.  09/12/2013--hyperplastic ×1.  Ambulatory Referral For Screening Colonoscopy      11. Generalized osteoarthritis of multiple sites        12. Pulmonary nodule, 12/16/2021--small left upper lobe micronodule.  Possible mucoid impaction right middle lobe.        13. Coronary artery calcification of native artery,November 8, 2023--coronary calcium score 2023.4        14. Therapeutic drug monitoring          Patient presents for his routine annual physical/preventative visit and seems to be doing fairly well.  He does have COPD and continues to smoke and I think that he would benefit from Airsupra instead of plain albuterol.  Hopefully it is covered by insurance.  Patient has a very high coronary artery calcium score and I like for his cholesterol profile to be better.  See below.    Several preventative health issues discussed including review of vaccinations and recommendations, including dietary issues, exercise and weight loss.  Safe sex practices discussed.  Patient advised to wear seatbelt whenever driving and avoid texting and driving.  Also advised to look both ways before crossing the street.  Patient is overdue for colonoscopy.  Advised to avoid tobacco products and minimize alcohol consumption.    Plan is as follows: Colonoscopy ordered.  Add ezetimibe 10 mg/day to the Lipitor 40 mg/day.  Start Airsupra 2 puffs 4 times daily instead of the albuterol.  I have recommended the patient use this at least 2-3 times per day every day and not just when wheezing.  Will set up fasting lab work and follow-up in about 2 months to reassess the cholesterol and his COPD.    Addendum: Patient is having a weak urinary stream, hesitation, and nocturia x  2.  Symptoms bad enough to warrant treatment.  Will start Flomax.      Procedures

## 2025-01-29 DIAGNOSIS — E55.9 VITAMIN D DEFICIENCY: Chronic | ICD-10-CM

## 2025-02-21 DIAGNOSIS — Z86.0100 HISTORY OF COLON POLYPS: Primary | ICD-10-CM

## 2025-03-14 ENCOUNTER — TELEPHONE (OUTPATIENT)
Dept: INTERNAL MEDICINE | Facility: CLINIC | Age: 65
End: 2025-03-14

## 2025-03-14 NOTE — TELEPHONE ENCOUNTER
"Caller: Hammad Justice \"Con\"    Relationship: Self    Best call back number: [phone]     What medication are you requesting: Z PACK, STEROID    What are your current symptoms: INFECTION IN CHEST    How long have you been experiencing symptoms: 03/13/2025    Have you had these symptoms before:    [x] Yes  [] No    Have you been treated for these symptoms before:   [x] Yes  [] No    If a prescription is needed, what is your preferred pharmacy and phone number: [facility] [id number] - [place], [place] - [facility] [facility] [address] - [phone]  - [phone]      Additional notes: PATIENT STATES THAT DR JOHNSTON HAS PRESCRIBED HIM THIS MEDICATION IN THE PAST FOR INFECTION IN HIS CHEST.     PATIENT STATES THAT HE IS SPITTING UP GREEN MUCUS.     PLEASE CALL PATIENT WITH ANY QUESTIONS OR CONCERNS.       "

## 2025-03-21 DIAGNOSIS — R73.01 IMPAIRED FASTING GLUCOSE: Chronic | ICD-10-CM

## 2025-03-21 DIAGNOSIS — E78.2 MIXED HYPERLIPIDEMIA: Chronic | ICD-10-CM

## 2025-03-21 DIAGNOSIS — D75.1 POLYCYTHEMIA SECONDARY TO SMOKING: Chronic | ICD-10-CM

## 2025-03-22 LAB
ALBUMIN SERPL-MCNC: 3.9 G/DL (ref 3.9–4.9)
ALP SERPL-CCNC: 95 IU/L (ref 44–121)
ALT SERPL-CCNC: 85 IU/L (ref 0–44)
AST SERPL-CCNC: 37 IU/L (ref 0–40)
BILIRUB SERPL-MCNC: 0.5 MG/DL (ref 0–1.2)
BUN SERPL-MCNC: 17 MG/DL (ref 8–27)
BUN/CREAT SERPL: 19 (ref 10–24)
CALCIUM SERPL-MCNC: 9.2 MG/DL (ref 8.6–10.2)
CHLORIDE SERPL-SCNC: 99 MMOL/L (ref 96–106)
CHOLEST SERPL-MCNC: 142 MG/DL (ref 100–199)
CK SERPL-CCNC: 78 U/L (ref 41–331)
CO2 SERPL-SCNC: 24 MMOL/L (ref 20–29)
CREAT SERPL-MCNC: 0.89 MG/DL (ref 0.76–1.27)
EGFRCR SERPLBLD CKD-EPI 2021: 97 ML/MIN/1.73
ERYTHROCYTE [DISTWIDTH] IN BLOOD BY AUTOMATED COUNT: 11.8 % (ref 11.6–15.4)
GLOBULIN SER CALC-MCNC: 2.1 G/DL (ref 1.5–4.5)
GLUCOSE SERPL-MCNC: 72 MG/DL (ref 70–99)
HCT VFR BLD AUTO: 51.6 % (ref 37.5–51)
HDL SERPL-SCNC: 46.2 UMOL/L
HDLC SERPL-MCNC: 76 MG/DL
HGB BLD-MCNC: 17.1 G/DL (ref 13–17.7)
LDL SERPL QN: 20.2 NM
LDL SERPL-SCNC: 707 NMOL/L
LDL SMALL SERPL-SCNC: 380 NMOL/L
LDLC SERPL CALC-MCNC: 45 MG/DL (ref 0–99)
MCH RBC QN AUTO: 32.6 PG (ref 26.6–33)
MCHC RBC AUTO-ENTMCNC: 33.1 G/DL (ref 31.5–35.7)
MCV RBC AUTO: 98 FL (ref 79–97)
PLATELET # BLD AUTO: 198 X10E3/UL (ref 150–450)
POTASSIUM SERPL-SCNC: 4.2 MMOL/L (ref 3.5–5.2)
PROT SERPL-MCNC: 6 G/DL (ref 6–8.5)
RBC # BLD AUTO: 5.25 X10E6/UL (ref 4.14–5.8)
SODIUM SERPL-SCNC: 138 MMOL/L (ref 134–144)
TRIGL SERPL-MCNC: 125 MG/DL (ref 0–149)
WBC # BLD AUTO: 8.2 X10E3/UL (ref 3.4–10.8)

## 2025-03-27 DIAGNOSIS — J41.1 MUCOPURULENT CHRONIC BRONCHITIS: Chronic | ICD-10-CM

## 2025-03-27 RX ORDER — UMECLIDINIUM BROMIDE AND VILANTEROL TRIFENATATE 62.5; 25 UG/1; UG/1
POWDER RESPIRATORY (INHALATION)
Qty: 180 EACH | Refills: 3 | Status: SHIPPED | OUTPATIENT
Start: 2025-03-27

## 2025-03-28 ENCOUNTER — OFFICE VISIT (OUTPATIENT)
Dept: INTERNAL MEDICINE | Facility: CLINIC | Age: 65
End: 2025-03-28
Payer: COMMERCIAL

## 2025-03-28 VITALS
RESPIRATION RATE: 16 BRPM | TEMPERATURE: 98 F | OXYGEN SATURATION: 92 % | BODY MASS INDEX: 24.5 KG/M2 | HEIGHT: 71 IN | SYSTOLIC BLOOD PRESSURE: 130 MMHG | WEIGHT: 175 LBS | DIASTOLIC BLOOD PRESSURE: 68 MMHG | HEART RATE: 87 BPM

## 2025-03-28 DIAGNOSIS — E55.9 VITAMIN D DEFICIENCY: Chronic | ICD-10-CM

## 2025-03-28 DIAGNOSIS — Z86.16 HISTORY OF 2019 NOVEL CORONAVIRUS DISEASE (COVID-19): Chronic | ICD-10-CM

## 2025-03-28 DIAGNOSIS — R73.01 IMPAIRED FASTING GLUCOSE: Primary | Chronic | ICD-10-CM

## 2025-03-28 DIAGNOSIS — S30.861A TICK BITE OF ABDOMEN, INITIAL ENCOUNTER: ICD-10-CM

## 2025-03-28 DIAGNOSIS — F17.210 HEAVY CIGARETTE SMOKER: Chronic | ICD-10-CM

## 2025-03-28 DIAGNOSIS — Z86.0100 HISTORY OF COLON POLYPS: Chronic | ICD-10-CM

## 2025-03-28 DIAGNOSIS — E78.2 MIXED HYPERLIPIDEMIA: Chronic | ICD-10-CM

## 2025-03-28 DIAGNOSIS — D75.1 POLYCYTHEMIA SECONDARY TO SMOKING: Chronic | ICD-10-CM

## 2025-03-28 DIAGNOSIS — I25.84 CORONARY ARTERY CALCIFICATION OF NATIVE ARTERY: Chronic | ICD-10-CM
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
## 2025-03-28 DIAGNOSIS — I25.10 CORONARY ARTERY CALCIFICATION OF NATIVE ARTERY: Chronic | ICD-10-CM

## 2025-03-28 DIAGNOSIS — Z51.81 THERAPEUTIC DRUG MONITORING: ICD-10-CM

## 2025-03-28 DIAGNOSIS — J41.1 MUCOPURULENT CHRONIC BRONCHITIS: Chronic | ICD-10-CM

## 2025-03-28 RX ORDER — DOXYCYCLINE 100 MG/1
CAPSULE ORAL
Qty: 20 CAPSULE | Refills: 0 | Status: SHIPPED | OUTPATIENT
Start: 2025-03-28

## 2025-03-28 RX ORDER — PREDNISONE 10 MG/1
TABLET ORAL
Qty: 56 TABLET | Refills: 0 | Status: SHIPPED | OUTPATIENT
Start: 2025-03-28

## 2025-03-28 NOTE — PROGRESS NOTES
03/28/2025    Patient Information  Hammad Justice                                                                                          [address] [facility] [facility] [address]      1960  [unfilled]  There is no work phone number on file.    Chief Complaint:     Follow-up chronic medical problems and recent lab work.    History of Present Illness:    Patient with history of numerous cardiac risk factors as noted below presents today for follow-up after initiation/adjustment of cholesterol medication due to hyperlipidemia.  Patient also has coronary artery calcification as well as impaired fasting glucose and is a heavy smoker and is at high risk for development of coronary artery disease.  He is currently on Zetia 10 mg/day along with Lipitor 40 mg/day.  Seems to be tolerating it.  Also patient has had an exacerbation of COPD recently and I started him on Anoro Ellipta inhaler and patient also uses albuterol as needed.  He still having some difficulty.  See below.  His past medical history reviewed and updated were necessary including health maintenance parameters.  This reveals he needs colonoscopy.    Review of Systems   Constitutional: Negative. Negative for fever.   HENT: Negative.  Negative for congestion and sore throat.    Eyes: Negative.    Cardiovascular: Negative.  Negative for chest pain, leg swelling, orthopnea, paroxysmal nocturnal dyspnea and syncope.   Respiratory:  Positive for shortness of breath, sputum production and wheezing. Negative for hemoptysis.    Endocrine: Negative.    Hematologic/Lymphatic: Negative.    Skin: Negative.    Musculoskeletal: Negative.    Gastrointestinal: Negative.  Negative for vomiting.   Genitourinary: Negative.    Neurological: Negative.  Negative for headaches.   Psychiatric/Behavioral: Negative.     Allergic/Immunologic: Negative.        Active Problems:    Patient Active Problem List   Diagnosis    Chronic obstructive pulmonary disease (COPD)     History of colon polyps, 7/28/2020--tubular adenoma x 2.  Hyperplastic x 3.  09/12/2013--hyperplastic ×1.    Generalized osteoarthritis of multiple sites    Hyperlipidemia    Cigarette nicotine dependence with nicotine-induced disorder    Vitamin D deficiency    Therapeutic drug monitoring    Routine physical examination    Polycythemia secondary to smoking    Benign prostatic hyperplasia with weak urinary stream    Impaired fasting glucose    Pulmonary nodule, 12/16/2021--small left upper lobe micronodule.  Possible mucoid impaction right middle lobe.    Coronary artery calcification of native artery,November 8, 2023--coronary calcium score 2023.4    Heavy cigarette smoker    History of 2019 novel coronavirus disease (COVID-19)    Tick bite of abdomen         Past Medical History:   Diagnosis Date    Benign prostatic hyperplasia with weak urinary stream 03/20/2019 March 20, 2019--patient presents with a progressively worsening decreased urine flow over the past year or so.  He reports he just takes a long time for him to initiate and complete urination.  He has no pain or dysuria.  No blood noted in his urine.  Urinalysis today reveals 31-50 WBCs but there are 3-6 epithelial cells present as well as 1+ calcium oxalate crystals.  No bacteria seen.  No red bl    Calcium oxalate crystals present in urine 03/20/2019 March 20, 2019--patient presents with a progressively worsening decreased urine flow over the past year or so.  He reports he just takes a long time for him to initiate and complete urination.  He has no pain or dysuria.  No blood noted in his urine.  Urinalysis today reveals 31-50 WBCs but there are 3-6 epithelial cells present as well as 1+ calcium oxalate crystals.  No bacteria seen.  No red bl    Chronic obstructive pulmonary disease (COPD) 08/12/2013 08/12/2013--pulmonary function tests revealed FVC 3.35, 66% of predicted. Post bronchodilator FVC 3.31. FEV1 1.77, 45% of predicted. 1.8 to post  bronchodilator. FEV1/FVC 53, Total lung capacity is 105% of predicted. Normal diffusion. Moderately severe obstructive pattern with no reversibility.   08/12/2013--pulmonary consult. Pulmonary function tests revealed moderate to severe obstructive pattern    Cigarette nicotine dependence with nicotine-induced disorder 06/14/2016    COPD (chronic obstructive pulmonary disease) 08/12/2013 08/12/2013--pulmonary function tests revealed FVC 3.35, 66% of predicted. Post bronchodilator FVC 3.31. FEV1 1.77, 45% of predicted. 1.8 to post bronchodilator. FEV1/FVC 53, Total lung capacity is 105% of predicted. Normal diffusion. Moderately severe obstructive pattern with no reversibility.   08/12/2013--pulmonary consult. Pulmonary function tests revealed moderate to severe obstructive pattern. Diagnosis COPD. Chronic bronchitis type. Patient was started on Ventolin when necessary and Spiriva HandiHaler use once daily.    Coronary artery calcification of native artery,November 8, 2023--coronary calcium score 2023.4 11/01/2023 November 8, 2023--coronary calcium score 2023.4     December 16, 2021--low-dose chest CT revealed solitary left upper lobe 3 mm peripheral micronodule.  Focal areas of pleural-based thickening seen in the right middle lobe with an adjacent linear area of opacity likely to be mucoid impaction measuring 6 mm in length and less than 2 mm in width.  No other focal lung opacity seen.  Mild hyperexpansi    Generalized osteoarthritis of multiple sites 06/14/2016    Heavy cigarette smoker 11/01/2023    History of 2019 novel coronavirus disease (COVID-19) 03/21/2024    History of Benign essential hypertension 04/04/2012 04/04/2012--blood pressure remains low and patient had to discontinue lisinopril because of dizziness.   03/14/2012--blood pressure too low at 102/64. Lisinopril HCT decreased to 10/12.5.   02/22/2012--blood pressure elevated at 140/90. Lisinopril HCT 20/12.5 daily initiated.    History of  colon polyps, 7/28/2020--tubular adenoma x 2.  Hyperplastic x 3.  09/12/2013--hyperplastic ×1. 09/12/2013 July 28, 2020--colonoscopy revealed a 1 mm polyp in the rectum.  #4, 1 to 3 mm polyps in the rectosigmoid colon.  #1, 1 mm polyp in the rectum.  Polyps were removed.  Pathology returned tubular adenoma in the proximal ascending and sigmoid colon.  Hyperplastic x3.  09/12/2013--colonoscopy revealed a 3-4 mm polyp in the sigmoid colon which was biopsied and removed. Otherwise, normal colonoscopy to     History of pulmonary function tests 08/12/2013 08/12/2013--pulmonary function tests revealed FVC 3.35, 66% of predicted. Post bronchodilator FVC 3.31. FEV1 1.77, 45% of predicted. 1.8 to post bronchodilator. FEV1/FVC 53, Total lung capacity is 105% of predicted. Normal diffusion. Moderately severe obstructive pattern with no reversibility.    Hyperlipidemia 06/14/2016    Impaired fasting glucose 03/20/2019    Polycythemia secondary to smoking 07/07/2016 07/07/2016--routine physical exam.  CBC reveals hemoglobin elevated at 17.9 and hematocrit elevated at 53.5.  Patient has a somewhat heavy smoker and I feel this is the etiology of this and will not pursue hematologic workup, at least at the present time.  Patient is in the process of trying to discontinue cigarettes.  Aspirin 81 mg per day initiated.    Pulmonary nodule, 12/16/2021--small left upper lobe micronodule.  Possible mucoid impaction right middle lobe. 11/01/2023 December 16, 2021--low-dose chest CT revealed solitary left upper lobe 3 mm peripheral micronodule.  Focal areas of pleural-based thickening seen in the right middle lobe with an adjacent linear area of opacity likely to be mucoid impaction measuring 6 mm in length and less than 2 mm in width.  No other focal lung opacity seen.  Mild hyperexpansion suggestive of COPD.  Moderate coronary artery desmond    Vitamin D deficiency 06/14/2016         Past Surgical History:   Procedure Laterality  Date    COLONOSCOPY  09/12/2013 09/12/2013--colonoscopy revealed a 3-4 mm polyp in the sigmoid colon which was biopsied and removed. Otherwise, normal colonoscopy to cecum with an excellent prep. Pathology returned hyperplastic.    COLONOSCOPY N/A 7/28/2020 July 28, 2020--colonoscopy revealed a 1 mm polyp in the rectum.  #4, 1 to 3 mm polyps in the rectosigmoid colon.  #1, 1 mm polyp in the rectum.  Polyps were removed.  Pathology returned tubular adenoma in the proximal ascending and sigmoid colon.  Hyperplastic x3.    KNEE ARTHROSCOPY Right 33 years of age    Age 33--right meniscus surgery.         No Known Allergies        Current Outpatient Medications:     Albuterol-Budesonide 90-80 MCG/ACT aerosol, Inhale 2 puffs 4 (Four) Times a Day., Disp: 10.7 g, Rfl: 11    Anoro Ellipta 62.5-25 MCG/ACT aerosol powder  inhaler, INHALE 1 DOSE BY MOUTH DAILY, Disp: 180 each, Rfl: 3    aspirin 81 MG EC tablet, Take 1 p.o. 3 days a week on Monday, Wednesday, and Friday for heart protection, Disp: , Rfl:     atorvastatin (LIPITOR) 40 MG tablet, Take 1 tablet (40 mg) every day for high cholesterol, Disp: 90 tablet, Rfl: 3    ezetimibe (Zetia) 10 MG tablet, Take 1 p.o. daily for high cholesterol.  Take with generic Lipitor., Disp: 90 tablet, Rfl: 3    tamsulosin (FLOMAX) 0.4 MG capsule 24 hr capsule, Take 1 p.o. daily after supper for prostate problems, Disp: 90 capsule, Rfl: 3    vitamin D3 125 MCG (5000 UT) capsule capsule, 1 by mouth daily as directed, Disp: , Rfl:     doxycycline (VIBRAMYCIN) 100 MG capsule, Take 1 p.o. twice daily until gone for tick bite, Disp: 20 capsule, Rfl: 0    predniSONE (DELTASONE) 10 MG tablet, 5 by mouth daily 7 days, then 4 daily 2 days, 3 daily 2 days, 2 daily 2 days, 1 daily 2 days, one half daily 2 days and discontinue., Disp: 56 tablet, Rfl: 0      Family History   Problem Relation Age of Onset    Cancer Mother     Heart attack Father     Anuerysm Brother         Cerebral Artery Aneurysm.  "Brother  of a brain aneurysm at age 52.    Heart attack Brother     Hypertension Other         Benign Essential    Heart disease Other     COPD Son     Arias Hyperthermia Neg Hx          Social History     Socioeconomic History    Marital status:     Number of children: 2    Highest education level: 8th grade   Tobacco Use    Smoking status: Every Day     Current packs/day: 0.00     Average packs/day: 1.5 packs/day for 10.0 years (15.0 ttl pk-yrs)     Types: Cigarettes     Start date: 2018     Last attempt to quit: 2023     Years since quittin.1     Passive exposure: Current    Smokeless tobacco: Never    Tobacco comments:     Quit smoking, 2023   Vaping Use    Vaping status: Never Used   Substance and Sexual Activity    Alcohol use: Yes     Alcohol/week: 20.0 standard drinks of alcohol     Types: 20 Cans of beer per week     Comment: 6 BEERS A DAY     Drug use: No    Sexual activity: Not Currently     Partners: Female     Birth control/protection: None         Vitals:    25 0730   BP: 130/68   Pulse: 87   Resp: 16   Temp: 98 °F (36.7 °C)   TempSrc: Oral   SpO2: 92%   Weight: 79.4 kg (175 lb)   Height: 180.3 cm (70.98\")        Body mass index is 24.42 kg/m².      Physical Exam:    General: Alert and oriented x 3.  No acute distress.  Normal affect.  HEENT: Pupils equal, round, reactive to light; extraocular movements intact; sclerae nonicteric; pharynx, ear canals and TMs normal.  Neck: Without JVD, thyromegaly, bruit, or adenopathy.  Lungs: Bilateral wheezing and rhonchi.  Heart: Regular rate and rhythm without murmur, rub, gallop, or click.  Abdomen: Soft, nontender, without hepatosplenomegaly or hernia.  Bowel sounds normal.  : Deferred.  Rectal: Deferred.  Extremities: Without clubbing, cyanosis, edema, or pulse deficit.  Neurologic: Intact without focal deficit.  Normal station and gait observed during ingress and egress from the examination room.  Skin: Without " significant lesion.  Musculoskeletal: Unremarkable.    Lab/other results:    CBC normal except hematocrit elevated 51.6 and MCV elevated at 98.  CMP normal except ALT 85.  Total cholesterol 142, triglycerides 125, LDL particle #707, HDL particle #46.2.  CK normal at 78.    Assessment/Plan:     Diagnosis Plan   1. Impaired fasting glucose  Comprehensive Metabolic Panel    Hemoglobin A1c      2. Hyperlipidemia  CK    Comprehensive Metabolic Panel    NMR LipoProfile      3. Coronary artery calcification of native artery,November 8, 2023--coronary calcium score 2023.4        4. Heavy cigarette smoker        5. History of 2019 novel coronavirus disease (COVID-19)        6. Vitamin D deficiency        7. Chronic obstructive pulmonary disease (COPD)  predniSONE (DELTASONE) 10 MG tablet    Vitamin D,25-Hydroxy      8. History of colon polyps, 7/28/2020--tubular adenoma x 2.  Hyperplastic x 3.  09/12/2013--hyperplastic ×1.  Ambulatory Referral For Screening Colonoscopy      9. Therapeutic drug monitoring        10. Tick bite of abdomen, initial encounter  doxycycline (VIBRAMYCIN) 100 MG capsule      11. Polycythemia secondary to smoking  CBC (No Diff)        Patient went to the urgent care recently for an exacerbation and was given a Z-Jose Medrol Dosepak.  Too early to tell that it is working.  I doubt that a Medrol Dosepak will be adequate.  See below.    Patient has mild impaired fasting glucose and I have encouraged him to watch his carbohydrate intake.  Hyperlipidemia seems to be better in a lot of ways including elevated or improved HDL after adjusting his cholesterol medication.  Patient continues to smoke but is seriously considering stopping.  This is particular important given his coronary artery calcification a very high coronary artery calcification score 2023.  He has COPD and had a recent patient and I doubt that a Medrol Dosepak will be enough to get him through this.  Z-Jose will be okay and I question if it is  totally necessary but we will go ahead and have him finish that.  Also patient has a history of colon polyps and needs a colonoscopy.    Plan is as follows: Discontinue the Medrol Dosepak and start prednisone 50 mg p.o. daily x 7 days, taper and discontinue.  No other changes in medical regimen.  Patient will follow-up in 6 months with lab prior or follow-up as needed.  I recommended that he use albuterol budesonide more regularly which he has not been doing.  The additional steroid should help along with the Anoro.    Addendum: Patient was recently bitten by a deer tick.  Rather than do testing I think we will add doxycycline.    Procedures

## 2025-04-10 DIAGNOSIS — E78.2 MIXED HYPERLIPIDEMIA: Chronic | ICD-10-CM

## 2025-04-10 RX ORDER — ATORVASTATIN CALCIUM 40 MG/1
40 TABLET, FILM COATED ORAL DAILY
Qty: 90 TABLET | Refills: 3 | Status: SHIPPED | OUTPATIENT
Start: 2025-04-10

## 2025-04-21 ENCOUNTER — TELEPHONE (OUTPATIENT)
Dept: INTERNAL MEDICINE | Facility: CLINIC | Age: 65
End: 2025-04-21

## 2025-04-21 NOTE — TELEPHONE ENCOUNTER
Caller: Hammad Justice    Relationship to patient: Self      Patient is needing: PATIENT STATES THAT DR. JOHNSTON CHANGED HIM FROM [facility] TO [facility] AND THAT [facility] DELETED THE PATIENT'S PROFILE.  PATIENT STATES THIS WAS SUPPOSED TO HAVE BEEN SENT 2 WEEKS AGO.  PATIENT IS ASKING TO PLEASE SEND THE PRESCRIPTION FOR T[facility]GY TO [facility].

## 2025-04-22 DIAGNOSIS — J41.1 MUCOPURULENT CHRONIC BRONCHITIS: Primary | Chronic | ICD-10-CM

## 2025-04-22 RX ORDER — FLUTICASONE FUROATE, UMECLIDINIUM BROMIDE AND VILANTEROL TRIFENATATE 200; 62.5; 25 UG/1; UG/1; UG/1
POWDER RESPIRATORY (INHALATION)
Qty: 1 EACH | Refills: 11 | Status: SHIPPED | OUTPATIENT
Start: 2025-04-22

## 2025-04-22 RX ORDER — ALBUTEROL SULFATE 90 UG/1
INHALANT RESPIRATORY (INHALATION)
Qty: 18 G | Refills: 1 | Status: SHIPPED | OUTPATIENT
Start: 2025-04-22

## 2025-05-29 ENCOUNTER — ANESTHESIA EVENT (OUTPATIENT)
Dept: GASTROENTEROLOGY | Facility: HOSPITAL | Age: 65
End: 2025-05-29
Payer: COMMERCIAL

## 2025-05-29 ENCOUNTER — ANESTHESIA (OUTPATIENT)
Dept: GASTROENTEROLOGY | Facility: HOSPITAL | Age: 65
End: 2025-05-29
Payer: COMMERCIAL

## 2025-05-29 ENCOUNTER — HOSPITAL ENCOUNTER (OUTPATIENT)
Facility: HOSPITAL | Age: 65
Setting detail: HOSPITAL OUTPATIENT SURGERY
Discharge: HOME | End: 2025-05-29
Attending: SURGERY | Admitting: SURGERY
Payer: COMMERCIAL

## 2025-05-29 VITALS
DIASTOLIC BLOOD PRESSURE: 77 MMHG | HEIGHT: 71 IN | RESPIRATION RATE: 16 BRPM | OXYGEN SATURATION: 98 % | BODY MASS INDEX: 24.25 KG/M2 | WEIGHT: 173.2 LBS | HEART RATE: 68 BPM | SYSTOLIC BLOOD PRESSURE: 121 MMHG | TEMPERATURE: 97.8 F

## 2025-05-29 DIAGNOSIS — Z86.0100 HISTORY OF COLON POLYPS: ICD-10-CM

## 2025-05-29 PROCEDURE — 25010000002 PROPOFOL 200 MG/20ML EMULSION: Performed by: NURSE ANESTHETIST, CERTIFIED REGISTERED

## 2025-05-29 PROCEDURE — 88305 TISSUE EXAM BY PATHOLOGIST: CPT | Performed by: SURGERY

## 2025-05-29 PROCEDURE — 25010000002 LIDOCAINE 2% SOLUTION: Performed by: NURSE ANESTHETIST, CERTIFIED REGISTERED

## 2025-05-29 PROCEDURE — S0260 H&P FOR SURGERY: HCPCS | Performed by: SURGERY

## 2025-05-29 PROCEDURE — 25010000002 PROPOFOL 1000 MG/100ML EMULSION: Performed by: NURSE ANESTHETIST, CERTIFIED REGISTERED

## 2025-05-29 PROCEDURE — 25810000003 LACTATED RINGERS PER 1000 ML: Performed by: SURGERY

## 2025-05-29 RX ORDER — PROPOFOL 10 MG/ML
INJECTION, EMULSION INTRAVENOUS AS NEEDED
Status: DISCONTINUED | OUTPATIENT
Start: 2025-05-29 | End: 2025-05-29 | Stop reason: SURG

## 2025-05-29 RX ORDER — SODIUM CHLORIDE, SODIUM LACTATE, POTASSIUM CHLORIDE, CALCIUM CHLORIDE 600; 310; 30; 20 MG/100ML; MG/100ML; MG/100ML; MG/100ML
1000 INJECTION, SOLUTION INTRAVENOUS CONTINUOUS
Status: DISCONTINUED | OUTPATIENT
Start: 2025-05-29 | End: 2025-05-29 | Stop reason: HOSPADM

## 2025-05-29 RX ORDER — LIDOCAINE HYDROCHLORIDE 10 MG/ML
0.5 INJECTION, SOLUTION INFILTRATION; PERINEURAL ONCE AS NEEDED
Status: DISCONTINUED | OUTPATIENT
Start: 2025-05-29 | End: 2025-05-29 | Stop reason: HOSPADM

## 2025-05-29 RX ORDER — LIDOCAINE HYDROCHLORIDE 20 MG/ML
INJECTION, SOLUTION INFILTRATION; PERINEURAL AS NEEDED
Status: DISCONTINUED | OUTPATIENT
Start: 2025-05-29 | End: 2025-05-29 | Stop reason: SURG

## 2025-05-29 RX ORDER — PROPOFOL 10 MG/ML
INJECTION, EMULSION INTRAVENOUS CONTINUOUS PRN
Status: DISCONTINUED | OUTPATIENT
Start: 2025-05-29 | End: 2025-05-29 | Stop reason: SURG

## 2025-05-29 RX ORDER — SODIUM CHLORIDE 0.9 % (FLUSH) 0.9 %
10 SYRINGE (ML) INJECTION AS NEEDED
Status: DISCONTINUED | OUTPATIENT
Start: 2025-05-29 | End: 2025-05-29 | Stop reason: HOSPADM

## 2025-05-29 RX ADMIN — LIDOCAINE HYDROCHLORIDE 60 MG: 20 INJECTION, SOLUTION INFILTRATION; PERINEURAL at 09:04

## 2025-05-29 RX ADMIN — SODIUM CHLORIDE, SODIUM LACTATE, POTASSIUM CHLORIDE, AND CALCIUM CHLORIDE 1000 ML: 600; 310; 30; 20 INJECTION, SOLUTION INTRAVENOUS at 08:34

## 2025-05-29 RX ADMIN — PROPOFOL 160 MCG/KG/MIN: 10 INJECTION, EMULSION INTRAVENOUS at 09:04

## 2025-05-29 RX ADMIN — PROPOFOL INJECTABLE EMULSION 100 MG: 10 INJECTION, EMULSION INTRAVENOUS at 09:04

## 2025-05-29 NOTE — ANESTHESIA PREPROCEDURE EVALUATION
Anesthesia Evaluation     Patient summary reviewed and Nursing notes reviewed                Airway   Mallampati: II  Dental      Pulmonary    (+) a smoker Current, COPD severe,  Cardiovascular     ECG reviewed  Rhythm: regular  Rate: normal    (+) hypertension, CAD (high calcium screen score and low risk treadmill test), hyperlipidemia      Neuro/Psych  (+) psychiatric history Anxiety  GI/Hepatic/Renal/Endo - negative ROS     Musculoskeletal     Abdominal    Substance History   (+) alcohol use     OB/GYN negative ob/gyn ROS         Other   arthritis,                 Anesthesia Plan    ASA 3     MAC     (COPD & continues to smoke  [facility]: chart review  LT: interview)  intravenous induction     Anesthetic plan, risks, benefits, and alternatives have been provided, discussed and informed consent has been obtained with: other and patient.    CODE STATUS:

## 2025-05-29 NOTE — H&P
Reason for Visit: Surveillance colonoscopy    HPI:  Patient presents for evaluation for colon cancer surveillance.  There is no family history of colon neoplasia. No family hx of gastric, ovarian, or uterine cancer.  Patient denies changes in bowel habits, diarrhea, constipation, abdominal pain, decreased caliber of stool, melena, brbpr, and weight loss.  There is no personal or family history of bleeding disorder or untoward reaction to anesthesia.  Patient has had a colonoscopy 5 year(s) ago.      Past Medical History:   Diagnosis Date    Benign prostatic hyperplasia with weak urinary stream 03/20/2019 March 20, 2019--patient presents with a progressively worsening decreased urine flow over the past year or so.  He reports he just takes a long time for him to initiate and complete urination.  He has no pain or dysuria.  No blood noted in his urine.  Urinalysis today reveals 31-50 WBCs but there are 3-6 epithelial cells present as well as 1+ calcium oxalate crystals.  No bacteria seen.  No red bl    Calcium oxalate crystals present in urine 03/20/2019 March 20, 2019--patient presents with a progressively worsening decreased urine flow over the past year or so.  He reports he just takes a long time for him to initiate and complete urination.  He has no pain or dysuria.  No blood noted in his urine.  Urinalysis today reveals 31-50 WBCs but there are 3-6 epithelial cells present as well as 1+ calcium oxalate crystals.  No bacteria seen.  No red bl    Chronic obstructive pulmonary disease (COPD) 08/12/2013 08/12/2013--pulmonary function tests revealed FVC 3.35, 66% of predicted. Post bronchodilator FVC 3.31. FEV1 1.77, 45% of predicted. 1.8 to post bronchodilator. FEV1/FVC 53, Total lung capacity is 105% of predicted. Normal diffusion. Moderately severe obstructive pattern with no reversibility.   08/12/2013--pulmonary consult. Pulmonary function tests revealed moderate to severe obstructive pattern     Cigarette nicotine dependence with nicotine-induced disorder 06/14/2016    COPD (chronic obstructive pulmonary disease) 08/12/2013 08/12/2013--pulmonary function tests revealed FVC 3.35, 66% of predicted. Post bronchodilator FVC 3.31. FEV1 1.77, 45% of predicted. 1.8 to post bronchodilator. FEV1/FVC 53, Total lung capacity is 105% of predicted. Normal diffusion. Moderately severe obstructive pattern with no reversibility.   08/12/2013--pulmonary consult. Pulmonary function tests revealed moderate to severe obstructive pattern. Diagnosis COPD. Chronic bronchitis type. Patient was started on Ventolin when necessary and Spiriva HandiHaler use once daily.    Coronary artery calcification of native artery,November 8, 2023--coronary calcium score 2023.4 11/01/2023 November 8, 2023--coronary calcium score 2023.4     December 16, 2021--low-dose chest CT revealed solitary left upper lobe 3 mm peripheral micronodule.  Focal areas of pleural-based thickening seen in the right middle lobe with an adjacent linear area of opacity likely to be mucoid impaction measuring 6 mm in length and less than 2 mm in width.  No other focal lung opacity seen.  Mild hyperexpansi    Generalized osteoarthritis of multiple sites 06/14/2016    Heavy cigarette smoker 11/01/2023    History of 2019 novel coronavirus disease (COVID-19) 03/21/2024    History of Benign essential hypertension 04/04/2012 04/04/2012--blood pressure remains low and patient had to discontinue lisinopril because of dizziness.   03/14/2012--blood pressure too low at 102/64. Lisinopril HCT decreased to 10/12.5.   02/22/2012--blood pressure elevated at 140/90. Lisinopril HCT 20/12.5 daily initiated.    History of colon polyps, 7/28/2020--tubular adenoma x 2.  Hyperplastic x 3.  09/12/2013--hyperplastic ×1. 09/12/2013 July 28, 2020--colonoscopy revealed a 1 mm polyp in the rectum.  #4, 1 to 3 mm polyps in the rectosigmoid colon.  #1, 1 mm polyp in the rectum.  Polyps  were removed.  Pathology returned tubular adenoma in the proximal ascending and sigmoid colon.  Hyperplastic x3.  09/12/2013--colonoscopy revealed a 3-4 mm polyp in the sigmoid colon which was biopsied and removed. Otherwise, normal colonoscopy to     History of pulmonary function tests 08/12/2013 08/12/2013--pulmonary function tests revealed FVC 3.35, 66% of predicted. Post bronchodilator FVC 3.31. FEV1 1.77, 45% of predicted. 1.8 to post bronchodilator. FEV1/FVC 53, Total lung capacity is 105% of predicted. Normal diffusion. Moderately severe obstructive pattern with no reversibility.    Hyperlipidemia 06/14/2016    Impaired fasting glucose 03/20/2019    Polycythemia secondary to smoking 07/07/2016 07/07/2016--routine physical exam.  CBC reveals hemoglobin elevated at 17.9 and hematocrit elevated at 53.5.  Patient has a somewhat heavy smoker and I feel this is the etiology of this and will not pursue hematologic workup, at least at the present time.  Patient is in the process of trying to discontinue cigarettes.  Aspirin 81 mg per day initiated.    Pulmonary nodule, 12/16/2021--small left upper lobe micronodule.  Possible mucoid impaction right middle lobe. 11/01/2023 December 16, 2021--low-dose chest CT revealed solitary left upper lobe 3 mm peripheral micronodule.  Focal areas of pleural-based thickening seen in the right middle lobe with an adjacent linear area of opacity likely to be mucoid impaction measuring 6 mm in length and less than 2 mm in width.  No other focal lung opacity seen.  Mild hyperexpansion suggestive of COPD.  Moderate coronary artery desmond    Vitamin D deficiency 06/14/2016       Past Surgical History:   Procedure Laterality Date    COLONOSCOPY  09/12/2013 09/12/2013--colonoscopy revealed a 3-4 mm polyp in the sigmoid colon which was biopsied and removed. Otherwise, normal colonoscopy to cecum with an excellent prep. Pathology returned hyperplastic.    COLONOSCOPY N/A 7/28/2020     2020--colonoscopy revealed a 1 mm polyp in the rectum.  #4, 1 to 3 mm polyps in the rectosigmoid colon.  #1, 1 mm polyp in the rectum.  Polyps were removed.  Pathology returned tubular adenoma in the proximal ascending and sigmoid colon.  Hyperplastic x3.    KNEE ARTHROSCOPY Right 33 years of age    Age 33--right meniscus surgery.         Current Facility-Administered Medications:     lactated ringers infusion 1,000 mL, 1,000 mL, Intravenous, Continuous, Steve Dalton MD, Last Rate: 25 mL/hr at 25 0834, Restarted at 25 0857    lidocaine (XYLOCAINE) 1 % injection 0.5 mL, 0.5 mL, Intradermal, Once PRN, Steve Dalton MD    sodium chloride 0.9 % flush 10 mL, 10 mL, Intravenous, PRN, Steve Dalton MD    No Known Allergies    Family History   Problem Relation Age of Onset    Cancer Mother     Heart attack Father     Anuerysm Brother         Cerebral Artery Aneurysm. Brother  of a brain aneurysm at age 52.    Heart attack Brother     Hypertension Other         Benign Essential    Heart disease Other     COPD Son     Malig Hyperthermia Neg Hx        Social History     Socioeconomic History    Marital status:     Number of children: 2    Highest education level: 8th grade   Tobacco Use    Smoking status: Every Day     Current packs/day: 0.00     Average packs/day: 1.5 packs/day for 10.0 years (15.0 ttl pk-yrs)     Types: Cigarettes     Start date: 2018     Last attempt to quit: 2023     Years since quittin.3     Passive exposure: Current    Smokeless tobacco: Never    Tobacco comments:     Quit smoking, 2023   Vaping Use    Vaping status: Never Used   Substance and Sexual Activity    Alcohol use: Yes     Alcohol/week: 30.0 standard drinks of alcohol     Types: 30 Cans of beer per week     Comment: 6 BEERS A DAY     Drug use: No    Sexual activity: Not Currently     Partners: Female     Birth control/protection: None        Review Of  "Systems:  A comprehensive review of systems was negative.    Objective:   Physical exam:  /88 (BP Location: Left arm, Patient Position: Lying)   Pulse 72   Temp 97.8 °F (36.6 °C) (Oral)   Resp 16   Ht 180.3 cm (71\")   Wt 78.6 kg (173 lb 3.2 oz)   SpO2 93%   BMI 24.16 kg/m²     General Appearance:  awake, alert, oriented, in no acute distress  Lungs:  Breathing Pattern: regular, no distress  Heart:  Heart regular rate and rhythm  Abdomen:  Soft, non-tender, normal bowel sounds; no bruits, organomegaly or masses.    BMI is within normal parameters. No other follow-up for BMI required.      Assessment:    Hammad Justice is a 64 y.o. male who presents for evaluation for colon cancer surveillance.    Patient has increased risk factors or warning signs or symptoms.  I reviewed current ACG guidelines for colon cancer screening:    A)  Flex sig q 5yrs with yearly fecal occult blood testing  B)  Virtual colonoscopy  C)  Colonoscopy with possible biopsy/polypectomy with IV sedation at appropriate       screening intervals    The patient has no family history of colon cancer.  He  has a personal history of colon polyp who presents for colon cancer surveillance evaluation.   I would advise a colonscopy.     The rationale for each option as well as all risks and benefits of each alternative were discussed with the patient in detail.  The patient understands and does wish to proceed with Colonoscopy, Diagnostic        The rationale for colonoscopy with possible biopsy, possible polypectomy, with IV sedation as well as all risks, benefits, and alternatives were discussed with the patient in detail. Risks including but not limited to perforation, bleeding,need for blood transfusion or emergent surgery ,and missed neoplasm were reviewed in detail with the patient The patient demonstrates full understanding and wishes to proceed with the colonoscopy.   "

## 2025-05-29 NOTE — DISCHARGE INSTRUCTIONS
For the next 24 hours patient needs to be with a responsible adult.    For THE REST OF TODAY DO NOT drive, operate machinery, appliances, drink alcohol, make important decisions or sign legal documents.    Start with a light or bland diet if you are feeling sick to your stomach otherwise advance to regular diet as tolerated.    Follow recommendations on procedure report if provided by your doctor.    Call  *** for problems [phone] ***    Problems may include but not limited to: large amounts of bleeding, trouble breathing, repeated vomiting, severe unrelieved pain, fever or chills.      If biopsies or polyps were taken, MD will call you with the results in about 7 days. If you don't hear from the MD in 2 weeks, call the number above.For the next 24 hours patient needs to be with a responsible adult.    For THE REST OF TODAY DO NOT drive, operate machinery, appliances, drink alcohol, make important decisions or sign legal documents.    Start with a light or bland diet if you are feeling sick to your stomach otherwise advance to regular diet as tolerated.    Follow recommendations on procedure report if provided by your doctor.    Call Dr Dalton for problems [phone]     Problems may include but not limited to: large amounts of bleeding, trouble breathing, repeated vomiting, severe unrelieved pain, fever or chills.      If biopsies or polyps were taken, MD will call you with the results in about 7 days. If you don't hear from the MD in 2 weeks, call the number above.

## 2025-05-30 ENCOUNTER — TELEPHONE (OUTPATIENT)
Dept: SURGERY | Facility: CLINIC | Age: 65
End: 2025-05-30
Payer: COMMERCIAL

## 2025-05-30 NOTE — TELEPHONE ENCOUNTER
I called and spoke with Hammad Reshma regarding his colonoscopy results.     Pathology report:   Final Diagnosis   1.  Proximal ascending colon, biopsy:               - Early hyperplastic polyp.     2.  Upper rectum, biopsies:               - Hyperplastic polyps.     3.  Mid rectum, biopsy:               - Hyperplastic polyp.     4.  Lower rectum, biopsies:   - Hyperplastic polyps       I recommend that he undergoes colonoscopy in 5 years for surveillance    He verbalized understanding and agreed    Steve Dalton MD  General, Minimally Invasive and Endoscopic Surgery  [facility]    [address], [address]  [place], [place], [address]  P: [phone]  F: [phone]

## (undated) DEVICE — SNAR POLYP CAPTIVATOR RND STFF 2.4 240CM 10MM 1P/U

## (undated) DEVICE — CANN O2 ETCO2 FITS ALL CONN CO2 SMPL A/ 7IN DISP LF

## (undated) DEVICE — SNAR POLYP SENSATION STDOVL 27 240 BX40

## (undated) DEVICE — SENSR O2 OXIMAX FNGR A/ 18IN NONSTR

## (undated) DEVICE — LN SMPL CO2 SHTRM SD STREAM W/M LUER

## (undated) DEVICE — THE SINGLE USE ETRAP – POLYP TRAP IS USED FOR SUCTION RETRIEVAL OF ENDOSCOPICALLY REMOVED POLYPS.: Brand: ETRAP

## (undated) DEVICE — KT ORCA ORCAPOD DISP STRL

## (undated) DEVICE — ADAPT CLN BIOGUARD AIR/H2O DISP

## (undated) DEVICE — THE TORRENT IRRIGATION SCOPE CONNECTOR IS USED WITH THE TORRENT IRRIGATION TUBING TO PROVIDE IRRIGATION FLUIDS SUCH AS STERILE WATER DURING GASTROINTESTINAL ENDOSCOPIC PROCEDURES WHEN USED IN CONJUNCTION WITH AN IRRIGATION PUMP (OR ELECTROSURGICAL UNIT).: Brand: TORRENT

## (undated) DEVICE — SINGLE-USE BIOPSY FORCEPS: Brand: RADIAL JAW 4

## (undated) DEVICE — TUBING, SUCTION, 1/4" X 10', STRAIGHT: Brand: MEDLINE